# Patient Record
Sex: MALE | Race: WHITE | NOT HISPANIC OR LATINO | Employment: OTHER | URBAN - METROPOLITAN AREA
[De-identification: names, ages, dates, MRNs, and addresses within clinical notes are randomized per-mention and may not be internally consistent; named-entity substitution may affect disease eponyms.]

---

## 2023-03-07 LAB
ALANINE AMINOTRANSFERASE (SGPT) (U/L) IN SER/PLAS: 16 U/L (ref 10–52)
ALBUMIN (G/DL) IN SER/PLAS: 4.3 G/DL (ref 3.4–5)
ALKALINE PHOSPHATASE (U/L) IN SER/PLAS: 75 U/L (ref 33–136)
ANION GAP IN SER/PLAS: 12 MMOL/L (ref 10–20)
ASPARTATE AMINOTRANSFERASE (SGOT) (U/L) IN SER/PLAS: 23 U/L (ref 9–39)
BILIRUBIN TOTAL (MG/DL) IN SER/PLAS: 0.8 MG/DL (ref 0–1.2)
C REACTIVE PROTEIN (MG/L) IN SER/PLAS: <0.1 MG/DL
CALCIUM (MG/DL) IN SER/PLAS: 9.1 MG/DL (ref 8.6–10.3)
CARBON DIOXIDE, TOTAL (MMOL/L) IN SER/PLAS: 30 MMOL/L (ref 21–32)
CHLORIDE (MMOL/L) IN SER/PLAS: 102 MMOL/L (ref 98–107)
CREATINE KINASE (U/L) IN SER/PLAS: 315 U/L (ref 0–325)
CREATININE (MG/DL) IN SER/PLAS: 0.94 MG/DL (ref 0.5–1.3)
ERYTHROCYTE DISTRIBUTION WIDTH (RATIO) BY AUTOMATED COUNT: 13.6 % (ref 11.5–14.5)
ERYTHROCYTE MEAN CORPUSCULAR HEMOGLOBIN CONCENTRATION (G/DL) BY AUTOMATED: 32.6 G/DL (ref 32–36)
ERYTHROCYTE MEAN CORPUSCULAR VOLUME (FL) BY AUTOMATED COUNT: 104 FL (ref 80–100)
ERYTHROCYTES (10*6/UL) IN BLOOD BY AUTOMATED COUNT: 4.4 X10E12/L (ref 4.5–5.9)
GFR MALE: 84 ML/MIN/1.73M2
GLUCOSE (MG/DL) IN SER/PLAS: 88 MG/DL (ref 74–99)
HEMATOCRIT (%) IN BLOOD BY AUTOMATED COUNT: 45.7 % (ref 41–52)
HEMOGLOBIN (G/DL) IN BLOOD: 14.9 G/DL (ref 13.5–17.5)
LEUKOCYTES (10*3/UL) IN BLOOD BY AUTOMATED COUNT: 8 X10E9/L (ref 4.4–11.3)
PLATELETS (10*3/UL) IN BLOOD AUTOMATED COUNT: 323 X10E9/L (ref 150–450)
POTASSIUM (MMOL/L) IN SER/PLAS: 4.4 MMOL/L (ref 3.5–5.3)
PROTEIN TOTAL: 6.8 G/DL (ref 6.4–8.2)
SEDIMENTATION RATE, ERYTHROCYTE: 3 MM/H (ref 0–20)
SODIUM (MMOL/L) IN SER/PLAS: 140 MMOL/L (ref 136–145)
UREA NITROGEN (MG/DL) IN SER/PLAS: 22 MG/DL (ref 6–23)

## 2023-04-28 DIAGNOSIS — N40.0 BENIGN PROSTATIC HYPERPLASIA WITHOUT LOWER URINARY TRACT SYMPTOMS: ICD-10-CM

## 2023-04-28 RX ORDER — TAMSULOSIN HYDROCHLORIDE 0.4 MG/1
CAPSULE ORAL
Qty: 90 CAPSULE | Refills: 0 | Status: SHIPPED | OUTPATIENT
Start: 2023-04-28 | End: 2023-07-24

## 2023-05-10 ENCOUNTER — OFFICE VISIT (OUTPATIENT)
Dept: PRIMARY CARE | Facility: CLINIC | Age: 78
End: 2023-05-10
Payer: MEDICARE

## 2023-05-10 VITALS
WEIGHT: 155 LBS | BODY MASS INDEX: 21.7 KG/M2 | SYSTOLIC BLOOD PRESSURE: 124 MMHG | DIASTOLIC BLOOD PRESSURE: 76 MMHG | OXYGEN SATURATION: 95 % | HEART RATE: 64 BPM | HEIGHT: 71 IN

## 2023-05-10 DIAGNOSIS — E03.9 ACQUIRED HYPOTHYROIDISM: ICD-10-CM

## 2023-05-10 DIAGNOSIS — E78.2 MIXED HYPERLIPIDEMIA: Primary | ICD-10-CM

## 2023-05-10 DIAGNOSIS — M54.2 CERVICAL PAIN: ICD-10-CM

## 2023-05-10 DIAGNOSIS — M35.3 POLYMYALGIA RHEUMATICA (MULTI): ICD-10-CM

## 2023-05-10 DIAGNOSIS — M06.4 INFLAMMATORY POLYARTHRITIS (MULTI): ICD-10-CM

## 2023-05-10 PROBLEM — G62.9 PERIPHERAL NEUROPATHY: Status: ACTIVE | Noted: 2023-05-10

## 2023-05-10 PROBLEM — E78.41 ELEVATED LP(A): Status: RESOLVED | Noted: 2022-08-17 | Resolved: 2023-05-10

## 2023-05-10 PROBLEM — H25.10 NUCLEAR SENILE CATARACT: Status: RESOLVED | Noted: 2018-07-24 | Resolved: 2023-05-10

## 2023-05-10 PROBLEM — M51.9 LUMBAR DISC DISEASE: Status: ACTIVE | Noted: 2023-05-10

## 2023-05-10 PROBLEM — M25.512 BILATERAL SHOULDER PAIN: Status: RESOLVED | Noted: 2023-05-10 | Resolved: 2023-05-10

## 2023-05-10 PROBLEM — R23.9 SKIN CHANGE: Status: RESOLVED | Noted: 2023-05-10 | Resolved: 2023-05-10

## 2023-05-10 PROBLEM — E78.00 HIGH CHOLESTEROL: Status: RESOLVED | Noted: 2023-05-10 | Resolved: 2023-05-10

## 2023-05-10 PROBLEM — I65.23 OCCLUSION AND STENOSIS OF BILATERAL CAROTID ARTERIES: Status: RESOLVED | Noted: 2023-02-18 | Resolved: 2023-05-10

## 2023-05-10 PROBLEM — K64.8 INTERNAL HEMORRHOIDS WITH COMPLICATION: Status: RESOLVED | Noted: 2023-05-10 | Resolved: 2023-05-10

## 2023-05-10 PROBLEM — N40.0 BENIGN ENLARGEMENT OF PROSTATE: Status: ACTIVE | Noted: 2023-05-10

## 2023-05-10 PROBLEM — I47.10 SUPRAVENTRICULAR TACHYCARDIA (CMS-HCC): Status: RESOLVED | Noted: 2019-12-16 | Resolved: 2023-05-10

## 2023-05-10 PROBLEM — R74.8 ELEVATED CPK: Status: RESOLVED | Noted: 2022-08-17 | Resolved: 2023-05-10

## 2023-05-10 PROBLEM — M65.30 ACQUIRED TRIGGER FINGER: Status: ACTIVE | Noted: 2023-05-10

## 2023-05-10 PROBLEM — M25.511 BILATERAL SHOULDER PAIN: Status: RESOLVED | Noted: 2023-05-10 | Resolved: 2023-05-10

## 2023-05-10 PROBLEM — R21 SKIN RASH: Status: RESOLVED | Noted: 2023-05-10 | Resolved: 2023-05-10

## 2023-05-10 PROBLEM — D12.6 COLON ADENOMAS: Status: RESOLVED | Noted: 2023-05-10 | Resolved: 2023-05-10

## 2023-05-10 PROBLEM — I35.1 NONRHEUMATIC AORTIC VALVE INSUFFICIENCY: Status: ACTIVE | Noted: 2021-03-24

## 2023-05-10 PROCEDURE — 1159F MED LIST DOCD IN RCRD: CPT | Performed by: NURSE PRACTITIONER

## 2023-05-10 PROCEDURE — 99213 OFFICE O/P EST LOW 20 MIN: CPT | Performed by: NURSE PRACTITIONER

## 2023-05-10 PROCEDURE — 1036F TOBACCO NON-USER: CPT | Performed by: NURSE PRACTITIONER

## 2023-05-10 PROCEDURE — 1160F RVW MEDS BY RX/DR IN RCRD: CPT | Performed by: NURSE PRACTITIONER

## 2023-05-10 RX ORDER — FOLIC ACID 1 MG/1
1 TABLET ORAL
COMMUNITY
Start: 2017-01-06

## 2023-05-10 RX ORDER — LEVOTHYROXINE SODIUM 88 UG/1
88 TABLET ORAL
Qty: 90 TABLET | Refills: 3 | Status: SHIPPED | OUTPATIENT
Start: 2023-05-10 | End: 2024-01-30 | Stop reason: SDUPTHER

## 2023-05-10 RX ORDER — METHOTREXATE 2.5 MG/1
4 TABLET ORAL WEEKLY
COMMUNITY
Start: 2014-08-18 | End: 2024-01-04 | Stop reason: SDUPTHER

## 2023-05-10 RX ORDER — METOPROLOL SUCCINATE 50 MG/1
50 TABLET, EXTENDED RELEASE ORAL DAILY
COMMUNITY

## 2023-05-10 RX ORDER — PREDNISONE 2.5 MG/1
2.5 TABLET ORAL
COMMUNITY
End: 2024-05-21 | Stop reason: SDUPTHER

## 2023-05-10 RX ORDER — ASPIRIN 81 MG/1
81 TABLET ORAL
COMMUNITY
Start: 2022-12-19

## 2023-05-10 RX ORDER — LEVOTHYROXINE SODIUM 88 UG/1
88 TABLET ORAL
COMMUNITY
Start: 2019-04-03 | End: 2023-05-10 | Stop reason: SDUPTHER

## 2023-05-10 ASSESSMENT — PATIENT HEALTH QUESTIONNAIRE - PHQ9
SUM OF ALL RESPONSES TO PHQ9 QUESTIONS 1 AND 2: 0
2. FEELING DOWN, DEPRESSED OR HOPELESS: NOT AT ALL
1. LITTLE INTEREST OR PLEASURE IN DOING THINGS: NOT AT ALL

## 2023-05-10 NOTE — ASSESSMENT & PLAN NOTE
Reviewed both injectables  Reivewed lipid panel  Recommended he take one of the medications that has been approved  Follow up with cardiology as scheduled

## 2023-05-10 NOTE — ASSESSMENT & PLAN NOTE
Xrays completed - show diffuse DJD  Completed PT  Was recommended to follow up with PCP if he would like to continue  He would like to wait one month and then will see if pain is better  If not he will call and order PT

## 2023-05-10 NOTE — PROGRESS NOTES
"Subjective   Dominguez Ross Jr. is a 77 y.o. male who presents for left side neck pain x 4months.  Has had PT and U/S of neck at [facility]   Would like to discuss cholesterol medication - unable to tolerate statins and was discussing injections    HPI    ROS was completed and all systems are negative with the exception of what was noted in the the HPI.     Objective     /76 (BP Location: Left arm, Patient Position: Sitting, BP Cuff Size: Small adult)   Pulse 64   Ht 1.803 m (5' 11\")   Wt 70.3 kg (155 lb)   SpO2 95%   BMI 21.62 kg/m²      Physical Exam  Vitals and nursing note reviewed.   Constitutional:       Appearance: Normal appearance. He is normal weight.   HENT:      Head: Normocephalic and atraumatic.      Right Ear: External ear normal.      Left Ear: External ear normal.      Nose: Nose normal.      Mouth/Throat:      Mouth: Mucous membranes are moist.   Eyes:      Extraocular Movements: Extraocular movements intact.      Conjunctiva/sclera: Conjunctivae normal.      Pupils: Pupils are equal, round, and reactive to light.   Cardiovascular:      Rate and Rhythm: Normal rate and regular rhythm.      Pulses: Normal pulses.      Heart sounds: Normal heart sounds.   Pulmonary:      Effort: Pulmonary effort is normal.      Breath sounds: Normal breath sounds.   Musculoskeletal:      Cervical back: Normal range of motion. Tenderness (right side low cervical pain with moderate palpation) present.   Skin:     General: Skin is warm.      Capillary Refill: Capillary refill takes less than 2 seconds.   Neurological:      General: No focal deficit present.      Mental Status: He is alert and oriented to person, place, and time. Mental status is at baseline.   Psychiatric:         Mood and Affect: Mood normal.         Behavior: Behavior normal.         Thought Content: Thought content normal.         Judgment: Judgment normal.         Assessment/Plan   Problem List Items Addressed This Visit          Nervous "    Cervical pain     Xrays completed - show diffuse DJD  Completed PT  Was recommended to follow up with PCP if he would like to continue  He would like to wait one month and then will see if pain is better  If not he will call and order PT          Polymyalgia rheumatica (CMS/HCC)     Stable   Follows rheumatology             Musculoskeletal    Inflammatory polyarthritis (CMS/HCC)     Stable   Follows rheumatology             Endocrine/Metabolic    Hypothyroidism     Reviewed TSH  Stable  Continue levothyroxine 88 mcg            Other    Mixed hyperlipidemia - Primary     Reviewed both injectables  Reivewed lipid panel  Recommended he take one of the medications that has been approved  Follow up with cardiology as scheduled

## 2023-05-10 NOTE — PATIENT INSTRUCTIONS
Apply heat to your neck for 20 minutes 2-3 times a day  If you want to continue PT let me know    Follow up with cardiology - you should take the medication for your cholesterol

## 2023-06-05 ENCOUNTER — TELEPHONE (OUTPATIENT)
Dept: PRIMARY CARE | Facility: CLINIC | Age: 78
End: 2023-06-05
Payer: MEDICARE

## 2023-06-05 NOTE — TELEPHONE ENCOUNTER
Patient called stating he has neck pain for awhile and has used Tylenol and Advil without relief. Patient would like referral to ENT. No rush due to sounds like it has been going on a while

## 2023-06-08 NOTE — TELEPHONE ENCOUNTER
Spoke with patient he states he will call back to schedule appointment for neck pain. Regarding ENT he was in ER few weeks ago for vertigo and was recommended to see ENMT for further testing. He would prefer someone in [facility]

## 2023-06-12 DIAGNOSIS — R42 VERTIGO: Primary | ICD-10-CM

## 2023-06-12 NOTE — PROGRESS NOTES
Patient called requesting referral to ENT for vertigo.  Was in the hospital for the symptoms.  Referral placed

## 2023-07-11 LAB
ALANINE AMINOTRANSFERASE (SGPT) (U/L) IN SER/PLAS: 24 U/L (ref 10–52)
ALBUMIN (G/DL) IN SER/PLAS: 4.1 G/DL (ref 3.4–5)
ALKALINE PHOSPHATASE (U/L) IN SER/PLAS: 72 U/L (ref 33–136)
ANION GAP IN SER/PLAS: 14 MMOL/L (ref 10–20)
ASPARTATE AMINOTRANSFERASE (SGOT) (U/L) IN SER/PLAS: 30 U/L (ref 9–39)
BILIRUBIN TOTAL (MG/DL) IN SER/PLAS: 0.5 MG/DL (ref 0–1.2)
C REACTIVE PROTEIN (MG/L) IN SER/PLAS: <0.1 MG/DL
CALCIUM (MG/DL) IN SER/PLAS: 9.4 MG/DL (ref 8.6–10.6)
CARBON DIOXIDE, TOTAL (MMOL/L) IN SER/PLAS: 28 MMOL/L (ref 21–32)
CHLORIDE (MMOL/L) IN SER/PLAS: 105 MMOL/L (ref 98–107)
CREATINE KINASE (U/L) IN SER/PLAS: 283 U/L (ref 0–325)
CREATININE (MG/DL) IN SER/PLAS: 1.03 MG/DL (ref 0.5–1.3)
ERYTHROCYTE DISTRIBUTION WIDTH (RATIO) BY AUTOMATED COUNT: 13.6 % (ref 11.5–14.5)
ERYTHROCYTE MEAN CORPUSCULAR HEMOGLOBIN CONCENTRATION (G/DL) BY AUTOMATED: 32.6 G/DL (ref 32–36)
ERYTHROCYTE MEAN CORPUSCULAR VOLUME (FL) BY AUTOMATED COUNT: 103 FL (ref 80–100)
ERYTHROCYTES (10*6/UL) IN BLOOD BY AUTOMATED COUNT: 4.2 X10E12/L (ref 4.5–5.9)
GFR MALE: 75 ML/MIN/1.73M2
GLUCOSE (MG/DL) IN SER/PLAS: 90 MG/DL (ref 74–99)
HEMATOCRIT (%) IN BLOOD BY AUTOMATED COUNT: 43.3 % (ref 41–52)
HEMOGLOBIN (G/DL) IN BLOOD: 14.1 G/DL (ref 13.5–17.5)
LEUKOCYTES (10*3/UL) IN BLOOD BY AUTOMATED COUNT: 6.5 X10E9/L (ref 4.4–11.3)
NRBC (PER 100 WBCS) BY AUTOMATED COUNT: 0 /100 WBC (ref 0–0)
PLATELETS (10*3/UL) IN BLOOD AUTOMATED COUNT: 365 X10E9/L (ref 150–450)
POTASSIUM (MMOL/L) IN SER/PLAS: 4.6 MMOL/L (ref 3.5–5.3)
PROTEIN TOTAL: 6.6 G/DL (ref 6.4–8.2)
SEDIMENTATION RATE, ERYTHROCYTE: 9 MM/H (ref 0–20)
SODIUM (MMOL/L) IN SER/PLAS: 142 MMOL/L (ref 136–145)
UREA NITROGEN (MG/DL) IN SER/PLAS: 20 MG/DL (ref 6–23)

## 2023-07-24 DIAGNOSIS — N40.0 BENIGN PROSTATIC HYPERPLASIA WITHOUT LOWER URINARY TRACT SYMPTOMS: ICD-10-CM

## 2023-07-24 RX ORDER — TAMSULOSIN HYDROCHLORIDE 0.4 MG/1
CAPSULE ORAL
Qty: 90 CAPSULE | Refills: 1 | Status: SHIPPED | OUTPATIENT
Start: 2023-07-24 | End: 2024-01-22

## 2023-08-22 ENCOUNTER — HOSPITAL ENCOUNTER (OUTPATIENT)
Dept: DATA CONVERSION | Facility: HOSPITAL | Age: 78
Discharge: HOME | End: 2023-08-22
Payer: MEDICARE

## 2023-08-22 DIAGNOSIS — M47.812 SPONDYLOSIS WITHOUT MYELOPATHY OR RADICULOPATHY, CERVICAL REGION: ICD-10-CM

## 2023-11-21 ENCOUNTER — OFFICE VISIT (OUTPATIENT)
Dept: RHEUMATOLOGY | Facility: CLINIC | Age: 78
End: 2023-11-21
Payer: MEDICARE

## 2023-11-21 ENCOUNTER — LAB (OUTPATIENT)
Dept: LAB | Facility: LAB | Age: 78
End: 2023-11-21
Payer: MEDICARE

## 2023-11-21 VITALS
DIASTOLIC BLOOD PRESSURE: 72 MMHG | HEIGHT: 70 IN | WEIGHT: 153 LBS | SYSTOLIC BLOOD PRESSURE: 117 MMHG | BODY MASS INDEX: 21.9 KG/M2

## 2023-11-21 DIAGNOSIS — Z79.899 ENCOUNTER FOR EYE EXAM DUE TO HIGH RISK MEDICATION: ICD-10-CM

## 2023-11-21 DIAGNOSIS — M35.3 POLYMYALGIA RHEUMATICA (MULTI): ICD-10-CM

## 2023-11-21 DIAGNOSIS — M35.3 POLYMYALGIA RHEUMATICA (MULTI): Primary | ICD-10-CM

## 2023-11-21 LAB
ALBUMIN SERPL BCP-MCNC: 4.4 G/DL (ref 3.4–5)
ALP SERPL-CCNC: 80 U/L (ref 33–136)
ALT SERPL W P-5'-P-CCNC: 17 U/L (ref 10–52)
ANION GAP SERPL CALC-SCNC: 11 MMOL/L (ref 10–20)
AST SERPL W P-5'-P-CCNC: 24 U/L (ref 9–39)
BILIRUB SERPL-MCNC: 0.7 MG/DL (ref 0–1.2)
BUN SERPL-MCNC: 21 MG/DL (ref 6–23)
CALCIUM SERPL-MCNC: 9.5 MG/DL (ref 8.6–10.3)
CHLORIDE SERPL-SCNC: 104 MMOL/L (ref 98–107)
CK SERPL-CCNC: 262 U/L (ref 0–325)
CO2 SERPL-SCNC: 30 MMOL/L (ref 21–32)
CREAT SERPL-MCNC: 0.92 MG/DL (ref 0.5–1.3)
CRP SERPL-MCNC: 0.1 MG/DL
ERYTHROCYTE [DISTWIDTH] IN BLOOD BY AUTOMATED COUNT: 13.4 % (ref 11.5–14.5)
ERYTHROCYTE [SEDIMENTATION RATE] IN BLOOD BY WESTERGREN METHOD: 13 MM/H (ref 0–20)
GFR SERPL CREATININE-BSD FRML MDRD: 86 ML/MIN/1.73M*2
GLUCOSE SERPL-MCNC: 93 MG/DL (ref 74–99)
HCT VFR BLD AUTO: 45.5 % (ref 41–52)
HGB BLD-MCNC: 14.6 G/DL (ref 13.5–17.5)
MCH RBC QN AUTO: 33.3 PG (ref 26–34)
MCHC RBC AUTO-ENTMCNC: 32.1 G/DL (ref 32–36)
MCV RBC AUTO: 104 FL (ref 80–100)
NRBC BLD-RTO: 0 /100 WBCS (ref 0–0)
PLATELET # BLD AUTO: 365 X10*3/UL (ref 150–450)
POTASSIUM SERPL-SCNC: 4.5 MMOL/L (ref 3.5–5.3)
PROT SERPL-MCNC: 6.9 G/DL (ref 6.4–8.2)
RBC # BLD AUTO: 4.38 X10*6/UL (ref 4.5–5.9)
SODIUM SERPL-SCNC: 140 MMOL/L (ref 136–145)
WBC # BLD AUTO: 6.4 X10*3/UL (ref 4.4–11.3)

## 2023-11-21 PROCEDURE — 86140 C-REACTIVE PROTEIN: CPT

## 2023-11-21 PROCEDURE — 82550 ASSAY OF CK (CPK): CPT

## 2023-11-21 PROCEDURE — 36415 COLL VENOUS BLD VENIPUNCTURE: CPT

## 2023-11-21 PROCEDURE — 80053 COMPREHEN METABOLIC PANEL: CPT

## 2023-11-21 PROCEDURE — 1159F MED LIST DOCD IN RCRD: CPT | Performed by: INTERNAL MEDICINE

## 2023-11-21 PROCEDURE — 85652 RBC SED RATE AUTOMATED: CPT

## 2023-11-21 PROCEDURE — 1160F RVW MEDS BY RX/DR IN RCRD: CPT | Performed by: INTERNAL MEDICINE

## 2023-11-21 PROCEDURE — 99213 OFFICE O/P EST LOW 20 MIN: CPT | Performed by: INTERNAL MEDICINE

## 2023-11-21 PROCEDURE — 85027 COMPLETE CBC AUTOMATED: CPT

## 2023-11-21 PROCEDURE — 1036F TOBACCO NON-USER: CPT | Performed by: INTERNAL MEDICINE

## 2023-11-21 ASSESSMENT — ENCOUNTER SYMPTOMS
NECK STIFFNESS: 1
NECK PAIN: 1
DIZZINESS: 1
FATIGUE: 1

## 2023-11-21 NOTE — PROGRESS NOTES
Subjective   Patient ID: Dominguez Ross is a 78 y.o. male who presents for Follow-up (4 month follow up ).  HPI  Patient with history of polymyalgia rheumatica with elevated CPK and inflammatory arthritis.    At his last visit on 7/11/2023 we had him continue with methotrexate and prednisone 2.5 mg.  He was having issues with his neck and we had referred him to pain management.  He had been having a lot of stress with the health of his wife at the time.    Overall he has been doing okay.  He did see pain management and had shots in his neck that did really help for a couple of weeks.  Still has some stiffness.  He has been doing his stretches.    When he bends over he thinks he has been having some vertigo or dizziness issues.  He was unable to make an appointment with ENT to be evaluated for vertigo.    His hands have been doing okay.    He did have a bad sore throat about a month ago.    No longer has trigger finger since his injection.      Review of Systems   Constitutional:  Positive for fatigue.   Musculoskeletal:  Positive for neck pain and neck stiffness.   Neurological:  Positive for dizziness.       Objective   Physical Exam  GEN: NAD A&O x3  HEENT:no conjunctival redness, wearing glasses  NEURO: able to get out of chair without issues good strength in his upper and lower extremities  SKIN: no rashes  PULSES: +radials  MSK: no current swelling in the dip pip mcp wrist elbows shoulders knees ankles.      Assessment/Plan       Elevated CPK history of PMR symptoms with elevated ESR with history of inflammatory arthritis. Has tried steroid sparing agents but did not change his elevated cpk.  Still gets episodes of vertigo when he bends over and stands back up.  Suggest that he make an appointment with ENT.  Does not have any evidence of inflammatory arthropathy today.  His strength has been stable.  He has been gaining weight again.  We will have him do blood work and have him come back again in 5 to 6 months  or as needed.

## 2023-11-22 NOTE — RESULT ENCOUNTER NOTE
I have reviewed your blood work from 11/21/2023.    Your complete blood count shows mild anemia with a mild decrease in your red blood cell count.    Your comprehensive metabolic panel showed normal liver and kidney functions.    Your inflammatory markers, sedimentation rate and C-reactive protein, were normal.    Your muscle marker, creatinine kinase, was normal.

## 2023-12-04 ENCOUNTER — OFFICE VISIT (OUTPATIENT)
Dept: PRIMARY CARE | Facility: CLINIC | Age: 78
End: 2023-12-04
Payer: MEDICARE

## 2023-12-04 ENCOUNTER — LAB (OUTPATIENT)
Dept: LAB | Facility: LAB | Age: 78
End: 2023-12-04
Payer: MEDICARE

## 2023-12-04 ENCOUNTER — APPOINTMENT (OUTPATIENT)
Dept: PRIMARY CARE | Facility: CLINIC | Age: 78
End: 2023-12-04
Payer: MEDICARE

## 2023-12-04 VITALS
OXYGEN SATURATION: 99 % | DIASTOLIC BLOOD PRESSURE: 74 MMHG | WEIGHT: 155.2 LBS | RESPIRATION RATE: 18 BRPM | BODY MASS INDEX: 22.22 KG/M2 | HEART RATE: 62 BPM | SYSTOLIC BLOOD PRESSURE: 118 MMHG | HEIGHT: 70 IN

## 2023-12-04 DIAGNOSIS — Z12.11 COLON CANCER SCREENING: ICD-10-CM

## 2023-12-04 DIAGNOSIS — Z00.00 ROUTINE GENERAL MEDICAL EXAMINATION AT HEALTH CARE FACILITY: ICD-10-CM

## 2023-12-04 DIAGNOSIS — E78.2 MIXED HYPERLIPIDEMIA: ICD-10-CM

## 2023-12-04 DIAGNOSIS — Z12.5 PROSTATE CANCER SCREENING: ICD-10-CM

## 2023-12-04 DIAGNOSIS — Z00.00 MEDICARE ANNUAL WELLNESS VISIT, SUBSEQUENT: ICD-10-CM

## 2023-12-04 DIAGNOSIS — E03.9 ACQUIRED HYPOTHYROIDISM: ICD-10-CM

## 2023-12-04 DIAGNOSIS — M35.3 POLYMYALGIA RHEUMATICA (MULTI): ICD-10-CM

## 2023-12-04 DIAGNOSIS — M06.4 INFLAMMATORY POLYARTHRITIS (MULTI): ICD-10-CM

## 2023-12-04 PROBLEM — M65.30 ACQUIRED TRIGGER FINGER: Status: RESOLVED | Noted: 2023-05-10 | Resolved: 2023-12-04

## 2023-12-04 LAB
T4 FREE SERPL-MCNC: 0.95 NG/DL (ref 0.61–1.12)
TSH SERPL-ACNC: 0.44 MIU/L (ref 0.44–3.98)

## 2023-12-04 PROCEDURE — 36415 COLL VENOUS BLD VENIPUNCTURE: CPT

## 2023-12-04 PROCEDURE — 1170F FXNL STATUS ASSESSED: CPT | Performed by: NURSE PRACTITIONER

## 2023-12-04 PROCEDURE — 1159F MED LIST DOCD IN RCRD: CPT | Performed by: NURSE PRACTITIONER

## 2023-12-04 PROCEDURE — 84443 ASSAY THYROID STIM HORMONE: CPT

## 2023-12-04 PROCEDURE — 1160F RVW MEDS BY RX/DR IN RCRD: CPT | Performed by: NURSE PRACTITIONER

## 2023-12-04 PROCEDURE — G0439 PPPS, SUBSEQ VISIT: HCPCS | Performed by: NURSE PRACTITIONER

## 2023-12-04 PROCEDURE — 84439 ASSAY OF FREE THYROXINE: CPT

## 2023-12-04 PROCEDURE — 1036F TOBACCO NON-USER: CPT | Performed by: NURSE PRACTITIONER

## 2023-12-04 PROCEDURE — G0103 PSA SCREENING: HCPCS

## 2023-12-04 ASSESSMENT — ACTIVITIES OF DAILY LIVING (ADL)
BATHING: INDEPENDENT
DRESSING: INDEPENDENT
MANAGING_FINANCES: INDEPENDENT
GROCERY_SHOPPING: INDEPENDENT
DOING_HOUSEWORK: INDEPENDENT
TAKING_MEDICATION: INDEPENDENT

## 2023-12-04 ASSESSMENT — ENCOUNTER SYMPTOMS
LOSS OF SENSATION IN FEET: 0
DEPRESSION: 0
OCCASIONAL FEELINGS OF UNSTEADINESS: 0

## 2023-12-04 NOTE — PROGRESS NOTES
"Subjective   Patient ID: Dominguez Ross is a 78 y.o. male who presents for Medicare Annual Wellness Visit Subsequent (Dominguez is in today for Medicare Wellness visit. At this time reports no other concerns at this time. ).    78-year-old male here for annual Medicare wellness exam.  He has no acute concerns   vertigo on and off since May 2023. Was recommended to see ENT, Dr Reyez but has not called to schedule yet.   Rheum- PMR, inflam arthritis, methotrexate. No side effects.   CV Dr Yin- started on Metoprolol XL 3 years ago. Was not concerned with the low Pulse, not aware of the dizziness and low BP.   ETOH- occasional beer  No nicotene  Opthal- cataract SX 5 yrs ago. No acute issues. OV 2 yrs  Dentist- q 6 mo  Hearing- vertigo. Has not seen ENT or audiologist  Up to date on vaccine.          Review of Systems   All other systems reviewed and are negative.      Objective   BP 92/64   Pulse 62   Resp 18   Ht 1.778 m (5' 10\")   Wt 70.4 kg (155 lb 3.2 oz)   SpO2 99%   BMI 22.27 kg/m²     Physical Exam  Vitals and nursing note reviewed.   Constitutional:       General: He is not in acute distress.     Appearance: Normal appearance.   HENT:      Head: Normocephalic and atraumatic.      Right Ear: External ear normal.      Left Ear: External ear normal.      Nose: Nose normal.      Mouth/Throat:      Mouth: Mucous membranes are moist.      Pharynx: Oropharynx is clear.   Eyes:      Extraocular Movements: Extraocular movements intact.      Conjunctiva/sclera: Conjunctivae normal.      Pupils: Pupils are equal, round, and reactive to light.   Neck:      Vascular: No carotid bruit.   Cardiovascular:      Rate and Rhythm: Normal rate and regular rhythm.      Pulses: Normal pulses.      Heart sounds: Normal heart sounds.   Pulmonary:      Effort: Pulmonary effort is normal.      Breath sounds: Normal breath sounds.   Abdominal:      General: Bowel sounds are normal. There is no distension.      Palpations: Abdomen is " soft.      Tenderness: There is no abdominal tenderness.   Musculoskeletal:         General: Normal range of motion.      Cervical back: Normal range of motion.      Right lower leg: No edema.      Left lower leg: No edema.   Lymphadenopathy:      Cervical: No cervical adenopathy.   Skin:     General: Skin is warm and dry.      Capillary Refill: Capillary refill takes less than 2 seconds.   Neurological:      General: No focal deficit present.      Mental Status: He is alert and oriented to person, place, and time.   Psychiatric:         Mood and Affect: Mood normal.         Behavior: Behavior normal.         Thought Content: Thought content normal.         Judgment: Judgment normal.         Assessment/Plan   Diagnoses and all orders for this visit:  Acquired hypothyroidism  Comments:  Update TSH and free T4.  On levothyroxine  Orders:  -     Thyroid Stimulating Hormone; Future  -     Thyroxine, Free; Future  Polymyalgia rheumatica (CMS/HCC)  Comments:  Managed by rheumatology, most recent labs reviewed  Inflammatory polyarthritis (CMS/HCC)  Comments:  rheum  Prostate cancer screening  Comments:  Check PSA  Orders:  -     PSA, Total and Free; Future  Mixed hyperlipidemia  Comments:  Chronic elevation cholesterol and LDL.  Labs reviewed from the year  Medicare annual wellness visit, subsequent  Comments:  Screening will be up-to-date  Colon cancer screening  Comments:  Colon cancer screening via colonoscopy ordered  Orders:  -     Colonoscopy Screening; Average Risk Patient; Future  Routine general medical examination at health care facility  -     1 Year Follow Up In Primary Care - Wellness Exam; Future

## 2023-12-07 LAB
PSA FREE MFR SERPL: 23 %
PSA FREE SERPL-MCNC: 0.7 NG/ML
PSA SERPL IA-MCNC: 3.1 NG/ML (ref 0–4)

## 2024-01-04 DIAGNOSIS — M35.3 POLYMYALGIA RHEUMATICA (MULTI): Primary | ICD-10-CM

## 2024-01-05 RX ORDER — METHOTREXATE 2.5 MG/1
10 TABLET ORAL
Qty: 52 TABLET | Refills: 3 | Status: SHIPPED | OUTPATIENT
Start: 2024-01-05 | End: 2024-05-21 | Stop reason: SDUPTHER

## 2024-01-19 DIAGNOSIS — N40.0 BENIGN PROSTATIC HYPERPLASIA WITHOUT LOWER URINARY TRACT SYMPTOMS: ICD-10-CM

## 2024-01-22 RX ORDER — TAMSULOSIN HYDROCHLORIDE 0.4 MG/1
CAPSULE ORAL
Qty: 90 CAPSULE | Refills: 1 | Status: SHIPPED | OUTPATIENT
Start: 2024-01-22 | End: 2024-01-30 | Stop reason: SDUPTHER

## 2024-01-30 DIAGNOSIS — N40.0 BENIGN PROSTATIC HYPERPLASIA WITHOUT LOWER URINARY TRACT SYMPTOMS: ICD-10-CM

## 2024-01-30 DIAGNOSIS — E03.9 ACQUIRED HYPOTHYROIDISM: ICD-10-CM

## 2024-02-01 RX ORDER — TAMSULOSIN HYDROCHLORIDE 0.4 MG/1
0.4 CAPSULE ORAL DAILY
Qty: 90 CAPSULE | Refills: 1 | Status: SHIPPED | OUTPATIENT
Start: 2024-02-01

## 2024-02-01 RX ORDER — LEVOTHYROXINE SODIUM 88 UG/1
88 TABLET ORAL
Qty: 90 TABLET | Refills: 1 | Status: SHIPPED | OUTPATIENT
Start: 2024-02-01

## 2024-02-02 DIAGNOSIS — Z12.11 COLON CANCER SCREENING: ICD-10-CM

## 2024-02-02 RX ORDER — POLYETHYLENE GLYCOL 3350, SODIUM SULFATE ANHYDROUS, SODIUM BICARBONATE, SODIUM CHLORIDE, POTASSIUM CHLORIDE 236; 22.74; 6.74; 5.86; 2.97 G/4L; G/4L; G/4L; G/4L; G/4L
4000 POWDER, FOR SOLUTION ORAL ONCE
Qty: 4000 ML | Refills: 0 | Status: SHIPPED | OUTPATIENT
Start: 2024-02-02 | End: 2024-02-02

## 2024-02-20 ENCOUNTER — CLINICAL SUPPORT (OUTPATIENT)
Dept: AUDIOLOGY | Facility: CLINIC | Age: 79
End: 2024-02-20
Payer: MEDICARE

## 2024-02-20 ENCOUNTER — OFFICE VISIT (OUTPATIENT)
Dept: OTOLARYNGOLOGY | Facility: CLINIC | Age: 79
End: 2024-02-20
Payer: MEDICARE

## 2024-02-20 VITALS — HEIGHT: 71 IN | BODY MASS INDEX: 21.56 KG/M2 | WEIGHT: 154 LBS

## 2024-02-20 DIAGNOSIS — H90.3 SENSORINEURAL HEARING LOSS (SNHL) OF BOTH EARS: ICD-10-CM

## 2024-02-20 DIAGNOSIS — H90.3 ASYMMETRICAL SENSORINEURAL HEARING LOSS: ICD-10-CM

## 2024-02-20 DIAGNOSIS — R42 DIZZINESS: Primary | ICD-10-CM

## 2024-02-20 DIAGNOSIS — R42 DIZZINESS AND GIDDINESS: Primary | ICD-10-CM

## 2024-02-20 PROCEDURE — 92567 TYMPANOMETRY: CPT | Performed by: AUDIOLOGIST

## 2024-02-20 PROCEDURE — 92557 COMPREHENSIVE HEARING TEST: CPT | Performed by: AUDIOLOGIST

## 2024-02-20 PROCEDURE — 1159F MED LIST DOCD IN RCRD: CPT | Performed by: OTOLARYNGOLOGY

## 2024-02-20 PROCEDURE — 99204 OFFICE O/P NEW MOD 45 MIN: CPT | Performed by: OTOLARYNGOLOGY

## 2024-02-20 PROCEDURE — 1036F TOBACCO NON-USER: CPT | Performed by: OTOLARYNGOLOGY

## 2024-02-20 ASSESSMENT — ENCOUNTER SYMPTOMS
OCCASIONAL FEELINGS OF UNSTEADINESS: 1
DEPRESSION: 0
LOSS OF SENSATION IN FEET: 1

## 2024-02-20 NOTE — PROGRESS NOTES
AUDIOLOGY ADULT AUDIOMETRIC EVALUATION    Name:  Dominguez Ross  :  1945  Age:  78 y.o.  Date of Evaluation:  2024    Reason for visit: Mr. Ross is seen in the clinic today at the request of Kaveh Reyez MD in otolaryngology for an audiologic evaluation.     HISTORY  The patient reported experiencing an episode of dizziness last , where he was kept in the hospital overnight.  Since then he has felt off balance.  Occasional bilateral tinnitus and a history of occupational noise exposure were reported and otalgia and aural pressure were denied.  The patient reported that he has difficulty understanding people when he is in background noise.    EVALUATION  See scanned audiogram: “Media” > “Audiology Report”.    RESULTS  Otoscopic Evaluation:  Right Ear: clear ear canal  Left Ear: clear ear canal    Immittance Measures:  Tympanometry:  Right Ear: Type A, normal tympanic membrane mobility with normal middle ear pressure   Left Ear: Type A, normal tympanic membrane mobility with normal middle ear pressure     Acoustic Reflexes:  Ipsilateral Right Ear: Could not evaluate since an adequate seal could not be maintained    Ipsilateral Left Ear: Could not evaluate since an adequate seal could not be maintained    Contralateral Right Ear: did not evaluate  Contralateral Left Ear: did not evaluate    Distortion Product Otoacoustic Emissions (DPOAEs):  Right Ear: passed 8995-2756 and 5000 Hz; absent 1311-7409 and 3041-3827 Hz  Left Ear: passed 1000 Hz; absent 3855-0056 Hz    Audiometry:  Test Technique and Reliability:   Standard audiometry via insert earphones/supra-aural headphones. Reliability is good.    Pure tone air and bone conduction audiometry:  Right Ear: normal hearing through 2000 Hz, with a moderate to moderately-severe sensorineural hearing loss at 2774-1140 Hz  Left Ear: mild to moderately-severe sensorineural hearing loss at 0711-9724 Hz    Speech Audiometry (Word Recognition  Scores):   Right Ear: Excellent, 92%   Left Ear: Excellent, 92%     IMPRESSIONS  Results of today's audiometric evaluation revealed an asymmetrical sensorineural hearing loss.    Present DPOAEs suggest normal/near normal cochlear outer hair cell function and are consistent with no greater than a mild hearing loss at those frequencies. Absent DPOAEs are consistent with abnormal cochlear outer hair cell function at those frequencies.    RECOMMENDATIONS  - Follow up with otolaryngology today as scheduled- note asymmetry.  - Annual audiologic evaluation, sooner if an acute change is noted.  - Wear hearing protection when exposed to excessive noise.   - Consider hearing aids. Contact insurance to determine if there is an applicable benefit and where it can be used. Contact our office to schedule an appointment should he wish to proceed with hearing aids through our clinic.  - Follow-up with medical care team as planned.    PATIENT EDUCATION  Discussed results, impressions and recommendations with the patient. Questions were addressed and the patient was encouraged to contact our office should concerns arise.    Time for this encounter: 1:00-1:30    Di Yin M.A., CCC-A   Licensed Audiologist

## 2024-02-20 NOTE — PROGRESS NOTES
Chief Complaint   Patient presents with    New Patient Visit     NPV- Vertigo      Date of Evaluation: 2/20/2024   ZOIE Ross is a 78 y.o. male here for evaluation of dizziness.  9 months ago he had acute onset vertigo.  He was admitted to the hospital and kept overnight for workup that was negative.  His dizziness has improved but he still has some unsteadiness when walking around.  He does have a history of cervical disc disease.  This has been less symptomatic lately.  He has unsteadiness every day.  No vertigo at rest.  His audiogram shows bilateral mid to high-frequency sensorineural hearing loss with slight asymmetry with the left ear worse than right in the mid frequencies.  He had noise exposure working at [employer] for 30 years.  He wore hearing protection.       Past Medical History:   Diagnosis Date    Abnormal finding of blood chemistry, unspecified 12/30/2013    Abnormal blood chemistry    Abnormal levels of other serum enzymes 03/10/2020    Elevated CPK    Abrasion of left upper arm, initial encounter 09/04/2019    Arm abrasion, left, initial encounter    Acquired trigger finger 05/10/2023    Acute upper respiratory infection, unspecified 12/29/2017    Acute upper respiratory infection    Autoimmune thyroiditis     Hashimoto's thyroiditis    Bilateral shoulder pain 05/10/2023    Colon adenomas 05/10/2023    Elevated CPK 08/17/2022    Encounter for general adult medical examination without abnormal findings 11/17/2017    Annual physical exam    Hemorrhage of anus and rectum 04/03/2019    Bright red rectal bleeding    Myalgia, unspecified site     Muscular aches    Neck pain on left side 05/10/2023    Nuclear senile cataract 07/24/2018    Formatting of this note might be different from the original. Added automatically from request for surgery [id number] Formatting of this note might be different from the original. Added automatically from request for surgery [id number]    Occlusion and stenosis  of bilateral carotid arteries 02/18/2023    Formatting of this note might be different from the original. Mild Test done at  2/2023    Other conditions influencing health status     Inflammatory Myopathy (Myositis)    Other conditions influencing health status     Nephrolithiasis Of The Right Kidney    Other conditions influencing health status     Induced CPK Elevation    Other conditions influencing health status 01/20/2016    History of cough    Other conditions influencing health status 11/17/2017    Chronic steroid use    Other disorders of iron metabolism     Iron overload    Pain in unspecified joint     Arthralgia of multiple sites    Personal history of other (healed) physical injury and trauma 11/03/2017    History of insect bite    Personal history of other diseases of male genital organs 10/02/2012    History of benign prostatic hypertrophy    Personal history of other diseases of the digestive system     History of hemorrhoids    Personal history of other diseases of the musculoskeletal system and connective tissue     History of polymyalgia rheumatica    Personal history of other diseases of the nervous system and sense organs     History of carpal tunnel syndrome    Personal history of other diseases of the nervous system and sense organs     History of peripheral neuropathy    Personal history of other diseases of the respiratory system 05/01/2018    History of acute sinusitis    Personal history of other diseases of the respiratory system 11/12/2015    History of acute pharyngitis    Personal history of other diseases of the respiratory system 03/09/2016    History of acute bronchitis    Personal history of other drug therapy 11/15/2016    History of influenza vaccination    Personal history of other endocrine, nutritional and metabolic disease     History of hypercholesterolemia    Personal history of other endocrine, nutritional and metabolic disease 11/17/2017    History of vitamin D deficiency  "   Personal history of other specified conditions     History of abdominal pain    Personal history of other specified conditions 02/12/2013    History of chest pain    Polyneuropathy, unspecified     Polyneuropathy    Skin change 05/10/2023    Sprain of joints and ligaments of other parts of neck, initial encounter     Sprain of joints and ligaments of other parts of neck    Supraventricular tachycardia 12/16/2019      Past Surgical History:   Procedure Laterality Date    BACK SURGERY  08/02/2012    Lower Back Surgery    COLONOSCOPY  10/29/2012    Complete Colonoscopy    MR HEAD ANGIO WO IV CONTRAST  5/24/2023    MR HEAD ANGIO WO IV CONTRAST [facility]    OTHER SURGICAL HISTORY  08/02/2012    Biopsy Muscle          Medications:   Current Outpatient Medications   Medication Instructions    aspirin 81 mg, oral, Daily RT    folic acid (FOLVITE) 1 mg, oral, Daily RT    levothyroxine (SYNTHROID, LEVOXYL) 88 mcg, oral, Daily before breakfast    methotrexate (TREXALL) 10 mg, oral, Weekly    metoprolol succinate XL (TOPROL-XL) 50 mg, oral, Daily    predniSONE (DELTASONE) 2.5 mg, oral, Daily RT    tamsulosin (FLOMAX) 0.4 mg, oral, Daily        Allergies:  Allergies   Allergen Reactions    Statins-Hmg-Coa Reductase Inhibitors Other        Physical Exam:  Last Recorded Vitals  Height 1.803 m (5' 11\"), weight 69.9 kg (154 lb).  []General appearance: Well-developed, well-nourished in no acute distress, conversant with normal voice quality    Head/face: No erythema or edema or facial tenderness, and normal facial nerve function bilaterally    External ear: Clear external auditory canals with normal pinnae  Tube status: N/A  Middle ear: Tympanic membranes intact and mobile, middle ears normal.  Tympanic membrane perforation: N/A  Mastoid bowl: N/A  Hearing: Normal conversational awareness at normal speech thresholds    Nose visualized using: Anterior rhinoscopy  Nasal dorsum: Nontraumatic midline appearance  Septum: " Midline, nonobstructing  Inferior turbinates: Normal, pink  Secretions: Dry    Oral cavity and oropharynx: Normal  Teeth: Good condition  Floor of mouth: without lesions  Palate: Normal hard palate, soft palate and uvula  Oropharynx: Clear, no lesions present  Buccal mucosa: Normal without masses or lesions  Lips: Normal    Nasopharynx: Inadequate mirror exam secondary to gag/anatomy    Neck:  Salivary glands: Normal bilateral parotid and submandibular glands by inspection and palpation.  Non-thyroid masses: No palpable masses or significant lymphadenopathy  Trachea: Midline  Thyroid: No thyromegaly or palpable nodules  Temporomandibular joint: Nontender  Cervical range of motion: Normal    Neurologic exam: Alert and oriented x3, appropriate affect.  Cranial nerves II-XII normal bilaterally  Extraocular movement: Extraocular movement intact, normal gaze alignment        Dominguez was seen today for new patient visit.  Diagnoses and all orders for this visit:  Dizziness and giddiness (Primary)  -     V-Hit; Future  Sensorineural hearing loss (SNHL) of both ears       PLAN  I have recommended we proceed with a VNG.  He will follow-up with me afterwards.  A hearing aid evaluation is advisable    Kaveh Reyez MD

## 2024-03-01 ENCOUNTER — OFFICE VISIT (OUTPATIENT)
Dept: GASTROENTEROLOGY | Facility: EXTERNAL LOCATION | Age: 79
End: 2024-03-01
Payer: MEDICARE

## 2024-03-01 DIAGNOSIS — Z12.11 COLON CANCER SCREENING: ICD-10-CM

## 2024-03-01 DIAGNOSIS — D12.2 BENIGN NEOPLASM OF ASCENDING COLON: ICD-10-CM

## 2024-03-01 DIAGNOSIS — Z12.11 COLON CANCER SCREENING: Primary | ICD-10-CM

## 2024-03-01 DIAGNOSIS — Z86.010 HISTORY OF COLONIC POLYPS: ICD-10-CM

## 2024-03-01 PROCEDURE — 0753T DGTZ GLS MCRSCP SLD LEVEL IV: CPT

## 2024-03-01 PROCEDURE — 45385 COLONOSCOPY W/LESION REMOVAL: CPT | Performed by: INTERNAL MEDICINE

## 2024-03-01 PROCEDURE — 1159F MED LIST DOCD IN RCRD: CPT | Performed by: INTERNAL MEDICINE

## 2024-03-01 PROCEDURE — 88305 TISSUE EXAM BY PATHOLOGIST: CPT

## 2024-03-01 PROCEDURE — 1036F TOBACCO NON-USER: CPT | Performed by: INTERNAL MEDICINE

## 2024-03-01 PROCEDURE — 88305 TISSUE EXAM BY PATHOLOGIST: CPT | Performed by: PATHOLOGY

## 2024-03-01 NOTE — PROGRESS NOTES
Colonoscopy showed no signs of any malignancy.  3 polyps which were small were removed.  I do not think he needs further surveillance.

## 2024-03-04 ENCOUNTER — CLINICAL SUPPORT (OUTPATIENT)
Dept: AUDIOLOGY | Facility: CLINIC | Age: 79
End: 2024-03-04
Payer: MEDICARE

## 2024-03-04 DIAGNOSIS — R42 DIZZINESS AND GIDDINESS: ICD-10-CM

## 2024-03-04 PROCEDURE — 92537 CALORIC VSTBLR TEST W/REC: CPT | Performed by: AUDIOLOGIST

## 2024-03-04 PROCEDURE — 92540 BASIC VESTIBULAR EVALUATION: CPT | Performed by: AUDIOLOGIST

## 2024-03-04 NOTE — PROGRESS NOTES
"REPORT FOR VIDEONYSTAGMOGRAPHY (VNG)    History: Patient referred by Kaveh Reyez MD for Videonystagmography.   Patient reports a significant decrease in balance; often has to \"hold on\".    *Significant fall risk.  In 2023, patient reports he had a significant attack of imbalance, room spinning; hospitalized for one night.   Perisistent off balanced feeling.   He reports a constant feeling of being \"off\"/slight dizziness.   Patient reports he has significant neck issues; stiffness; pain.      Otoscopy: Clear ear canals bilaterally.     Test Parameters:  Ocular Motility Testing to visually guided targets was conducted using a dual channel video-recording technique for the recording of eye movement in the horizontal and vertical planes.  Bithermal air caloric testing was performed at 48 degrees C and 24 degrees C.      Random Saccades: Abnormal: Abnormal accuracy on 3 separate trials; normal latency and velocity.      Gaze Tests: Normal;  Normal gaze to left, right, up, and down targets.     Smooth Pursuit: Normal   Optokinetic: Normal: Peak slow-phase velocity (SPV) within normal limits.    Spontaneous Nystagmus: Normal; no clinically significant nystagmus recorded  Addy Hallpike: Normal: no clinically significant nystagmus recorded; no patient report dizziness.    Positionals: Normal: no clinically significant nystagmus reported in seated, supine, head right or head left positions.     Calorics: Abnormal;  Caloric responses were symmetrical.  Positive for Failure of Fixation Suppression      RW:  42    LW: 48     RC:   20          Caloric weakness: NOT significant      Directional Preponderance: NOT Significant      Failure of Fixation Suppression: Positive     vHIT: did not perform; significant neck issues     *PLEASE SEE SCANNED IN REPORT AND RECORDINGS.      Impressions: Abnormal VNG     Oculomotor performance demonstrated abnormal accuracy for saccades for 3 separate trials suggesting CNS " pathology.   All other oculomotor tasks were performed within normal limits.      Positional tests were normal; No dizziness reported; No BPPV    Caloric responses were symmetrical.   Positive for Failure of Fixation Suppression suggesting CNS pathology      Recommendations: Follow-up with Kaveh Reyez MD          Consider CNS pathology; cerebellar     Consider cervicogenic factors; physical therapy

## 2024-03-05 ENCOUNTER — LAB REQUISITION (OUTPATIENT)
Dept: LAB | Facility: HOSPITAL | Age: 79
End: 2024-03-05
Payer: MEDICARE

## 2024-03-12 ENCOUNTER — OFFICE VISIT (OUTPATIENT)
Dept: OTOLARYNGOLOGY | Facility: CLINIC | Age: 79
End: 2024-03-12
Payer: MEDICARE

## 2024-03-12 VITALS — WEIGHT: 154 LBS | HEIGHT: 71 IN | BODY MASS INDEX: 21.56 KG/M2

## 2024-03-12 DIAGNOSIS — R42 DIZZINESS AND GIDDINESS: Primary | ICD-10-CM

## 2024-03-12 DIAGNOSIS — H90.3 SENSORINEURAL HEARING LOSS (SNHL) OF BOTH EARS: ICD-10-CM

## 2024-03-12 PROCEDURE — 1036F TOBACCO NON-USER: CPT | Performed by: OTOLARYNGOLOGY

## 2024-03-12 PROCEDURE — 1159F MED LIST DOCD IN RCRD: CPT | Performed by: OTOLARYNGOLOGY

## 2024-03-12 PROCEDURE — 99214 OFFICE O/P EST MOD 30 MIN: CPT | Performed by: OTOLARYNGOLOGY

## 2024-03-12 NOTE — PROGRESS NOTES
Chief Complaint   Patient presents with    Results     LOV: 2/2024 VNG RESULTS, NOTE IN CHART      Date of Evaluation: 3/12/2024   HPI  He returns in follow-up for dizziness.    VNG showed: Abnormal VNG     Oculomotor performance demonstrated abnormal accuracy for saccades for 3 separate trials suggesting CNS pathology.   All other oculomotor tasks were performed within normal limits.      Positional tests were normal; No dizziness reported; No BPPV    Caloric responses were symmetrical.   Positive for Failure of Fixation Suppression suggesting CNS pathology  Dominguez Ross is a 78 y.o. male here for evaluation of dizziness.  9 months ago he had acute onset vertigo.  He was admitted to the hospital and kept overnight for workup that was negative.  His dizziness has improved but he still has some unsteadiness when walking around.  He does have a history of cervical disc disease.  This has been less symptomatic lately.  He has unsteadiness every day.  No vertigo at rest.  His audiogram shows bilateral mid to high-frequency sensorineural hearing loss with slight asymmetry with the left ear worse than right in the mid frequencies.  He had noise exposure working at [employer] for 30 years.  He wore hearing protection.       Past Medical History:   Diagnosis Date    Abnormal finding of blood chemistry, unspecified 12/30/2013    Abnormal blood chemistry    Abnormal levels of other serum enzymes 03/10/2020    Elevated CPK    Abrasion of left upper arm, initial encounter 09/04/2019    Arm abrasion, left, initial encounter    Acquired trigger finger 05/10/2023    Acute upper respiratory infection, unspecified 12/29/2017    Acute upper respiratory infection    Autoimmune thyroiditis     Hashimoto's thyroiditis    Bilateral shoulder pain 05/10/2023    Colon adenomas 05/10/2023    Elevated CPK 08/17/2022    Encounter for general adult medical examination without abnormal findings 11/17/2017    Annual physical exam     Hemorrhage of anus and rectum 04/03/2019    Bright red rectal bleeding    Myalgia, unspecified site     Muscular aches    Neck pain on left side 05/10/2023    Nuclear senile cataract 07/24/2018    Formatting of this note might be different from the original. Added automatically from request for surgery [id number] Formatting of this note might be different from the original. Added automatically from request for surgery [id number]    Occlusion and stenosis of bilateral carotid arteries 02/18/2023    Formatting of this note might be different from the original. Mild Test done at  2/2023    Other conditions influencing health status     Inflammatory Myopathy (Myositis)    Other conditions influencing health status     Nephrolithiasis Of The Right Kidney    Other conditions influencing health status     Induced CPK Elevation    Other conditions influencing health status 01/20/2016    History of cough    Other conditions influencing health status 11/17/2017    Chronic steroid use    Other disorders of iron metabolism     Iron overload    Pain in unspecified joint     Arthralgia of multiple sites    Personal history of other (healed) physical injury and trauma 11/03/2017    History of insect bite    Personal history of other diseases of male genital organs 10/02/2012    History of benign prostatic hypertrophy    Personal history of other diseases of the digestive system     History of hemorrhoids    Personal history of other diseases of the musculoskeletal system and connective tissue     History of polymyalgia rheumatica    Personal history of other diseases of the nervous system and sense organs     History of carpal tunnel syndrome    Personal history of other diseases of the nervous system and sense organs     History of peripheral neuropathy    Personal history of other diseases of the respiratory system 05/01/2018    History of acute sinusitis    Personal history of other diseases of the respiratory system 11/12/2015     "History of acute pharyngitis    Personal history of other diseases of the respiratory system 03/09/2016    History of acute bronchitis    Personal history of other drug therapy 11/15/2016    History of influenza vaccination    Personal history of other endocrine, nutritional and metabolic disease     History of hypercholesterolemia    Personal history of other endocrine, nutritional and metabolic disease 11/17/2017    History of vitamin D deficiency    Personal history of other specified conditions     History of abdominal pain    Personal history of other specified conditions 02/12/2013    History of chest pain    Polyneuropathy, unspecified     Polyneuropathy    Skin change 05/10/2023    Sprain of joints and ligaments of other parts of neck, initial encounter     Sprain of joints and ligaments of other parts of neck    Supraventricular tachycardia 12/16/2019      Past Surgical History:   Procedure Laterality Date    BACK SURGERY  08/02/2012    Lower Back Surgery    COLONOSCOPY  10/29/2012    Complete Colonoscopy    MR HEAD ANGIO WO IV CONTRAST  5/24/2023    MR HEAD ANGIO WO IV CONTRAST [facility]    OTHER SURGICAL HISTORY  08/02/2012    Biopsy Muscle          Medications:   Current Outpatient Medications   Medication Instructions    aspirin 81 mg, oral, Daily RT    folic acid (FOLVITE) 1 mg, oral, Daily RT    levothyroxine (SYNTHROID, LEVOXYL) 88 mcg, oral, Daily before breakfast    methotrexate (TREXALL) 10 mg, oral, Weekly    metoprolol succinate XL (TOPROL-XL) 50 mg, oral, Daily    predniSONE (DELTASONE) 2.5 mg, oral, Daily RT    tamsulosin (FLOMAX) 0.4 mg, oral, Daily        Allergies:  Allergies   Allergen Reactions    Statins-Hmg-Coa Reductase Inhibitors Other        Physical Exam:  Last Recorded Vitals  Height 1.803 m (5' 11\"), weight 69.9 kg (154 lb).  []General appearance: Well-developed, well-nourished in no acute distress, conversant with normal voice quality    Head/face: No erythema or edema " or facial tenderness, and normal facial nerve function bilaterally    External ear: Clear external auditory canals with normal pinnae  Tube status: N/A  Middle ear: Tympanic membranes intact and mobile, middle ears normal.  Tympanic membrane perforation: N/A  Mastoid bowl: N/A  Hearing: Normal conversational awareness at normal speech thresholds    Nose visualized using: Anterior rhinoscopy  Nasal dorsum: Nontraumatic midline appearance  Septum: Midline, nonobstructing  Inferior turbinates: Normal, pink  Secretions: Dry    Oral cavity and oropharynx: Normal  Teeth: Good condition  Floor of mouth: without lesions  Palate: Normal hard palate, soft palate and uvula  Oropharynx: Clear, no lesions present  Buccal mucosa: Normal without masses or lesions  Lips: Normal    Nasopharynx: Inadequate mirror exam secondary to gag/anatomy    Neck:  Salivary glands: Normal bilateral parotid and submandibular glands by inspection and palpation.  Non-thyroid masses: No palpable masses or significant lymphadenopathy  Trachea: Midline  Thyroid: No thyromegaly or palpable nodules  Temporomandibular joint: Nontender  Cervical range of motion: Normal    Neurologic exam: Alert and oriented x3, appropriate affect.  Cranial nerves II-XII normal bilaterally  Extraocular movement: Extraocular movement intact, normal gaze alignment        Dominguez was seen today for results.  Diagnoses and all orders for this visit:  Dizziness and giddiness (Primary)  Sensorineural hearing loss (SNHL) of both ears         PLAN  I have recommended he move forward with physical therapy to address central VNG abnormalities and cervical problems.  Follow-up with me if he is not better.  A hearing aid evaluation is advisable    Kaveh Reyez MD

## 2024-03-13 LAB
LABORATORY COMMENT REPORT: NORMAL
PATH REPORT.FINAL DX SPEC: NORMAL
PATH REPORT.GROSS SPEC: NORMAL
PATH REPORT.RELEVANT HX SPEC: NORMAL
PATH REPORT.TOTAL CANCER: NORMAL

## 2024-03-13 NOTE — RESULT ENCOUNTER NOTE
The polyp removed during your colonoscopy is an adenoma which is benign, but has the potential to turn into cancer if not removed.  Your polyp was completely removed. Due to your age and the small polyp removed, I do not recommend any further surveillance colonoscopies.  If you were to develop any change in bowel habits, rectal bleeding, or abdominal pain a colonoscopy may be recommended at that time.    Please call the office if you have any questions or concerns.

## 2024-03-26 ENCOUNTER — EVALUATION (OUTPATIENT)
Dept: PHYSICAL THERAPY | Facility: CLINIC | Age: 79
End: 2024-03-26
Payer: MEDICARE

## 2024-03-26 DIAGNOSIS — R42 DIZZINESS: Primary | ICD-10-CM

## 2024-03-26 DIAGNOSIS — R42 DIZZINESS AND GIDDINESS: ICD-10-CM

## 2024-03-26 DIAGNOSIS — M54.2 NECK PAIN: ICD-10-CM

## 2024-03-26 DIAGNOSIS — R26.81 UNSTEADINESS ON FEET: ICD-10-CM

## 2024-03-26 PROCEDURE — 97530 THERAPEUTIC ACTIVITIES: CPT | Mod: GP | Performed by: PHYSICAL THERAPIST

## 2024-03-26 PROCEDURE — 97162 PT EVAL MOD COMPLEX 30 MIN: CPT | Mod: GP | Performed by: PHYSICAL THERAPIST

## 2024-03-26 ASSESSMENT — PAIN SCALES - GENERAL
PAINLEVEL_OUTOF10: 6
PAINLEVEL_OUTOF10: 3

## 2024-03-26 ASSESSMENT — PAIN - FUNCTIONAL ASSESSMENT: PAIN_FUNCTIONAL_ASSESSMENT: 0-10

## 2024-03-26 NOTE — PROGRESS NOTES
Vestibular Evaluation    Patient Name: Dominguez Ross  MRN: [id number]  Today's Date: 03/26/24  Moderate complexity due to patient's clinical presentation being evolving with changing characteristics, with comorbidities/complexities to include chronic neck pain; peripheral neuropathy; , all of which may negatively impact rehab tolerance and progression.  Time Calculation  Start Time: 0935  Stop Time: 1018  Time Calculation (min): 43 min  PT Therapeutic Procedures Time Entry  Therapeutic Activity Time Entry: 13     Insurance:  Visit number: Rama of 8  Authorization info: NO AUTH / [place]   Insurance Type: Payor: MEDICARE / Plan: MEDICARE PART A AND B / Product Type: *No Product type* /     Current Problem:   1. Dizziness  Follow Up In Physical Therapy      2. Neck pain  Follow Up In Physical Therapy      3. Unsteadiness on feet  Follow Up In Physical Therapy      4. Dizziness and giddiness  Referral to Physical Therapy          Subjective   General Visit Information:  General  Reason for Referral: Dizziness  Referred By: Dr. Reyez  Past Medical History Relevant to Rehab: B neuropathy; cervical history; lumbar surgery '99  General Comment: Pt presents to therapy with complications from dizziness. He reports that last year he was in PT for neck issues which did help a little bit. He went to see Dr. Reyez due to dizziness had a VNG done showing central problem. He went to see pain mangement after seeing PT for neck issues who did give him a steroid injection which helped with the neck pain however dizziness remained. He reports initially when he had vertigo he came to ER and than had a hard time getting an appointemtn with an ENT. He reports after the VNG testing he did find some relief. He reports that if he takes advil it will help his neck pain and even reduces his dizziness. He reports last may he woke up with the dizziness that lasted for a couple hours. He does not report any spinning sensation but there is a  sensation of dizziness. He reports neck pain when he turns toward the L with L side neck pain. He does not report any headaches or sensitivity to light. He does not report falls but there is imbalance when he bends over as he feels that he wants to fall backwards. He reports there is no dizziness when he is sitting but once he gets up and moves there is dizziness.  Precautions:  Precautions  Precautions Comment: Falls  Pain:  Pain Assessment  Pain Assessment: 0-10  Pain Score: 3  Pain Type: Chronic pain  Pain Location: Neck  Pain Orientation: Left  Pain 2  Pain Score 2: 6 (Dizziness)    Prior Level of Function:  Level of [place]: Independent with ADLs and functional transfers    Objective     Neuro Oculomotor:  Range of Motion: Normal  Smooth Pursuit: Normal  Horizontal Saccades: Other: (comment) (VNG demonstrated abnormality; no symptoms this date)  Vertical Saccades: Other: (comment) (VNG demonstrated abnormality; no symptom increase this date)  Eye Alignment : Normal  Distraction Cover: Normal  Distraction Uncover: Normal  Neuro Vestibular:  Horizontal VOR: Other: (comment) (No nystagmus; pt unable to to report if symptom increase appear to be slight amount)  Vertical VOR: Other: (comment) (No nystagmus; pt unable to to report if symptom increase appear to be slight amount)  Positional Testing:  Positional Testing Comment: Per subjective report does not appear to be position, VNG testing also did not demonstrate a positive finding will assess as needed    Balance:   Firm surface:   Romberg EO and EC increased sway but able to maintain  Tandem Able to get into position however unable to maintain for more than 3 seconds  SLS WNL B  Foam surface:  Romberg EC large sway posterior lateral with LOB     Extremities Assessment: Cervical Spine    Observation  Cervical Posture: slight forward    Cervical AROM  Cervical flexion: (80°): 60  Cervical extension: (50°): 30  Cervical rotation right: (80°): 55  Cervical  rotation left: (80°): 40    Outcome Measures:  Other Measures  Dizziness Handicap Inventory: 26  Dynamic Gait Index (DGI): Modified 4 point: 9/12     Treatments:  Therapeutic Activity  Therapeutic Activity Performed: Yes  Therapeutic Activity 1: Educated on vestibular and balance systems in relation to current findings.  Therapeutic Activity 2: Educated on cervicogenic dizziness    OP EDUCATION:  Outpatient Education  Individual(s) Educated: Patient  Education Provided: Anatomy, Body Mechanics, Home Exercise Program, Fall Risk, POC  Risk and Benefits Discussed with Patient/Caregiver/Other: yes  Patient/Caregiver Demonstrated Understanding: yes  Plan of Care Discussed and Agreed Upon: yes  Patient Response to Education: Patient/Caregiver Verbalized Understanding of Information, Patient/Caregiver Performed Return Demonstration of Exercises/Activities, Patient/Caregiver Asked Appropriate Questions    Assessment   Assessment:   PT Assessment Results: Pain, Decreased range of motion, Impaired balance (Dizziness)  Rehab Prognosis: Good  Assessment Comment: Pt is a 78 y.o male presenting to therapy with chonic dizziness and neck pain. He did not report any increase in symptoms during testing however noted large functional deficit during DGI and romberg assessment especially with posterior displacement and lateral sway creating fall risk. Pt does have unequal neck rotation which could be contributing to dizziness symptoms however at this time cheif complaint does appear to be multifactoral and pt would benefit from therapy interventions including vestibular and balance training and cervical ROM improvement without pt is a further risk of functional decline.      Plan:  Treatment/Interventions: Canalith repositioning, Dry needling, Education/ Instruction, Gait training, Hot pack, Manual therapy, Mechanical traction, Neuromuscular re-education, Taping techniques, Therapeutic activities, Therapeutic exercises  PT Plan: Skilled  PT  PT Frequency: 1 time per week  Duration: 9 weeks or 8 visits + eval  Onset Date: 03/26/24  Certification Period Start Date: 03/26/24  Certification Period End Date: 06/24/24  Number of Treatments Authorized: [place]  Rehab Potential: Good  Plan of Care Agreement: Patient    Goals:  Active       PT Problem       PT Goal 1       Start:  03/26/24    Expected End:  05/05/24       Pt will be 50% IND with HEP in 4 weeks in order to progress with therapy.          PT Goal 2 - Personal Goal       Start:  03/26/24    Expected End:  05/05/24       Pt will demonstrate equal cervical rotation without pain in 4 weeks to return to swimming and reduce cervicogenic involvement with dizziness.         PT Goal 3       Start:  03/26/24    Expected End:  05/05/24       Pt will improve 4 point DGI score to 12/12 in 4 weeks in order to reduce fall risk.          PT Goal 4       Start:  03/26/24    Expected End:  05/05/24       Pt will report a reduction in dizziness score to 2/10 in 4 weeks in order to improve reduce dizziness             PT Problem       PT Goal 1       Start:  03/26/24    Expected End:  06/04/24       Pt will be 100% IND with HEP in 8 weeks in order to maintain progress with therapy.           PT Goal 2       Start:  03/26/24    Expected End:  06/04/24       Pt will be able to perform community ambulation demonstrating normalized gait pattern with sway or path deviations in 8 weeks for recreational ambulation and a reduction in fall risk.          PT Goal 3       Start:  03/26/24    Expected End:  06/04/24       Pt will demonstrate subjective improvement of ADLs and recreational activities through improved score of 0 on DHI in 8 weeks.

## 2024-04-04 ENCOUNTER — TREATMENT (OUTPATIENT)
Dept: PHYSICAL THERAPY | Facility: CLINIC | Age: 79
End: 2024-04-04
Payer: MEDICARE

## 2024-04-04 DIAGNOSIS — R26.81 UNSTEADINESS ON FEET: ICD-10-CM

## 2024-04-04 DIAGNOSIS — M54.2 NECK PAIN: ICD-10-CM

## 2024-04-04 DIAGNOSIS — R42 DIZZINESS: ICD-10-CM

## 2024-04-04 PROCEDURE — 97110 THERAPEUTIC EXERCISES: CPT | Mod: GP,CQ

## 2024-04-04 PROCEDURE — 97112 NEUROMUSCULAR REEDUCATION: CPT | Mod: GP,CQ

## 2024-04-04 ASSESSMENT — PAIN - FUNCTIONAL ASSESSMENT: PAIN_FUNCTIONAL_ASSESSMENT: 0-10

## 2024-04-04 ASSESSMENT — PAIN SCALES - GENERAL: PAINLEVEL_OUTOF10: 0 - NO PAIN

## 2024-04-04 NOTE — PROGRESS NOTES
"Physical Therapy Treatment    Patient Name: Dominguez Ross  MRN: [id number]  Today's Date: 4/4/2024  Time Calculation  Start Time: 1315  Stop Time: 1355  Time Calculation (min): 40 min  PT Therapeutic Procedures Time Entry  Neuromuscular Re-Education Time Entry: 25  Therapeutic Exercise Time Entry: 15    Insurance:  Visit number: 1 of 8  Authorization info: NO AUTH / [place]   Insurance Type: Payor: MEDICARE / Plan: MEDICARE PART A AND B / Product Type: *No Product type* /     Current Problem   1. Dizziness  Follow Up In Physical Therapy      2. Neck pain  Follow Up In Physical Therapy      3. Unsteadiness on feet  Follow Up In Physical Therapy          Subjective   General    Patient states he has intermittent neck pain today, states he has been performing HEP for ~1 week now and reports sometimes it feels like he does too many of them and his neck starts to hurt. Reports dizziness in intermittent as well and experiences the most dizziness when he bends down.     Precautions:   Fall risk     Pain   Pain Assessment: 0-10  Pain Score: 0 - No pain  Pain Location: Neck    Post Treatment Pain Level n/a    Objective   Posterior sway with romberg stance    Exacerbation of dizziness with head turns - able to recover in ~2'    Treatments:  Therapeutic Exercise:  Therapeutic Exercise  Therapeutic Exercise Performed: Yes  Therapeutic Exercise Activity 1: seated UT stretch with distraction and gentle OP 3 x 20\" H B  Therapeutic Exercise Activity 2: seated LS stretch 3 x 20\" H B  Therapeutic Exercise Activity 3: seated chin tuck 10x2    Neuro Re-ed:   Balance/Neuromuscular Re-Education  Balance/Neuromuscular Re-Education Activity Performed: Yes  Balance/Neuromuscular Re-Education Activity 1: romberg stance 3 x 30\"  Balance/Neuromuscular Re-Education Activity 2: romberg with head turns 3 x 30\"  Balance/Neuromuscular Re-Education Activity 3: romberg with head nods 3 x 30\"  Balance/Neuromuscular Re-Education Activity 4: romberg " "VOR cancellation 3 x 30\"  Balance/Neuromuscular Re-Education Activity 5: romberg stance on air ex with EC 3 x 30\"      Assessment   Assessment:    Emphasis today on cervical movement as well as mild stability, mainly focused on balance tasks this date with vestibular head movements. Challenged by head nods in romberg with increased dizziness as well as romberg EC on air ex with posterior and L lateral LOB.    Plan:    Generalized balance, visual tracking, cervical movement, cervical stability.    OP EDUCATION:   Review of HEP    Goals:   Active       PT Problem       PT Goal 1       Start:  03/26/24    Expected End:  05/05/24       Pt will be 50% IND with HEP in 4 weeks in order to progress with therapy.          PT Goal 2 - Personal Goal       Start:  03/26/24    Expected End:  05/05/24       Pt will demonstrate equal cervical rotation without pain in 4 weeks to return to swimming and reduce cervicogenic involvement with dizziness.         PT Goal 3       Start:  03/26/24    Expected End:  05/05/24       Pt will improve 4 point DGI score to 12/12 in 4 weeks in order to reduce fall risk.          PT Goal 4       Start:  03/26/24    Expected End:  05/05/24       Pt will report a reduction in dizziness score to 2/10 in 4 weeks in order to improve reduce dizziness             PT Problem       PT Goal 1       Start:  03/26/24    Expected End:  06/04/24       Pt will be 100% IND with HEP in 8 weeks in order to maintain progress with therapy.           PT Goal 2       Start:  03/26/24    Expected End:  06/04/24       Pt will be able to perform community ambulation demonstrating normalized gait pattern with sway or path deviations in 8 weeks for recreational ambulation and a reduction in fall risk.          PT Goal 3       Start:  03/26/24    Expected End:  06/04/24       Pt will demonstrate subjective improvement of ADLs and recreational activities through improved score of 0 on DHI in 8 weeks.                "

## 2024-04-08 ENCOUNTER — TREATMENT (OUTPATIENT)
Dept: PHYSICAL THERAPY | Facility: CLINIC | Age: 79
End: 2024-04-08
Payer: MEDICARE

## 2024-04-08 DIAGNOSIS — M54.2 NECK PAIN: ICD-10-CM

## 2024-04-08 DIAGNOSIS — R42 DIZZINESS: ICD-10-CM

## 2024-04-08 DIAGNOSIS — R26.81 UNSTEADINESS ON FEET: ICD-10-CM

## 2024-04-08 PROCEDURE — 97112 NEUROMUSCULAR REEDUCATION: CPT | Mod: GP,CQ

## 2024-04-08 PROCEDURE — 97110 THERAPEUTIC EXERCISES: CPT | Mod: GP,CQ

## 2024-04-08 ASSESSMENT — PAIN - FUNCTIONAL ASSESSMENT: PAIN_FUNCTIONAL_ASSESSMENT: 0-10

## 2024-04-08 ASSESSMENT — PAIN SCALES - GENERAL: PAINLEVEL_OUTOF10: 0 - NO PAIN

## 2024-04-08 NOTE — PROGRESS NOTES
"Physical Therapy Treatment    Patient Name: Dominguez Ross  MRN: [id number]  Today's Date: 4/8/2024  Time Calculation  Start Time: 1010  Stop Time: 1050  Time Calculation (min): 40 min  PT Therapeutic Procedures Time Entry  Neuromuscular Re-Education Time Entry: 15  Therapeutic Exercise Time Entry: 25    Insurance:  Visit number: 2 of 8  Authorization info: NO AUTH / [place]   Insurance Type: Payor: MEDICARE / Plan: MEDICARE PART A AND B / Product Type: *No Product type* /     Current Problem   1. Dizziness  Follow Up In Physical Therapy      2. Neck pain  Follow Up In Physical Therapy      3. Unsteadiness on feet  Follow Up In Physical Therapy          Subjective   General    Patient reports he doesn't have any dizziness, however he was was gardening yesterday and every time he stood up from kneeling he felt unsteady. No neck pain this morning.    Precautions:   Falls     Pain   Pain Assessment: 0-10  Pain Score: 0 - No pain    Post Treatment Pain Level   0/10    Objective   LOB with romberg, EC, head turns     Posture: FFP with rounded shoulders, fwd head, and winging to bilat scaps    Treatments:  Therapeutic Exercise:  Therapeutic Exercise  Therapeutic Exercise Performed: Yes  Therapeutic Exercise Activity 1: UBE retro 5' bwd  Therapeutic Exercise Activity 2: seated LS stretch 3 x 30\" H B (shoulder distraction)  Therapeutic Exercise Activity 3: seated UT stretch with distraction and gentle OP 3 x 30\" H B  Therapeutic Exercise Activity 4: standing ISO chin tuck 5\" H 10x3  Therapeutic Exercise Activity 5: standing scap squeezes PTB 10x3    Neuro Re-ed:   Balance/Neuromuscular Re-Education  Balance/Neuromuscular Re-Education Activity Performed: Yes  Balance/Neuromuscular Re-Education Activity 1: romberg with head turns 3 x 30\"  Balance/Neuromuscular Re-Education Activity 2: romberg with head nods 3 x 30\"  Balance/Neuromuscular Re-Education Activity 3: romberg EC head turns 3 x 30\"  Balance/Neuromuscular " Re-Education Activity 4: romber>tandom alt 10x2      Assessment   Assessment:    Continued focus on cervical and scapular stability as well as head movement/ROM. Also focused on vestibular functional tasks as well as generalized balance. LOB with NBOS positions, slight increased dizziness with head turns but able to recover under 1 min each time.     Plan:    Cervical and scapular stability, balance    OP EDUCATION:   Review of HEP    Goals:   Active       PT Problem       PT Goal 1       Start:  03/26/24    Expected End:  05/05/24       Pt will be 50% IND with HEP in 4 weeks in order to progress with therapy.          PT Goal 2 - Personal Goal       Start:  03/26/24    Expected End:  05/05/24       Pt will demonstrate equal cervical rotation without pain in 4 weeks to return to swimming and reduce cervicogenic involvement with dizziness.         PT Goal 3       Start:  03/26/24    Expected End:  05/05/24       Pt will improve 4 point DGI score to 12/12 in 4 weeks in order to reduce fall risk.          PT Goal 4       Start:  03/26/24    Expected End:  05/05/24       Pt will report a reduction in dizziness score to 2/10 in 4 weeks in order to improve reduce dizziness             PT Problem       PT Goal 1       Start:  03/26/24    Expected End:  06/04/24       Pt will be 100% IND with HEP in 8 weeks in order to maintain progress with therapy.           PT Goal 2       Start:  03/26/24    Expected End:  06/04/24       Pt will be able to perform community ambulation demonstrating normalized gait pattern with sway or path deviations in 8 weeks for recreational ambulation and a reduction in fall risk.          PT Goal 3       Start:  03/26/24    Expected End:  06/04/24       Pt will demonstrate subjective improvement of ADLs and recreational activities through improved score of 0 on DHI in 8 weeks.

## 2024-04-18 ENCOUNTER — TREATMENT (OUTPATIENT)
Dept: PHYSICAL THERAPY | Facility: CLINIC | Age: 79
End: 2024-04-18
Payer: MEDICARE

## 2024-04-18 DIAGNOSIS — M54.2 NECK PAIN: ICD-10-CM

## 2024-04-18 DIAGNOSIS — R42 DIZZINESS: ICD-10-CM

## 2024-04-18 DIAGNOSIS — R26.81 UNSTEADINESS ON FEET: ICD-10-CM

## 2024-04-18 PROCEDURE — 97112 NEUROMUSCULAR REEDUCATION: CPT | Mod: GP,CQ

## 2024-04-18 PROCEDURE — 97110 THERAPEUTIC EXERCISES: CPT | Mod: GP,CQ

## 2024-04-18 PROCEDURE — 97140 MANUAL THERAPY 1/> REGIONS: CPT | Mod: GP,CQ

## 2024-04-18 ASSESSMENT — PAIN SCALES - GENERAL: PAINLEVEL_OUTOF10: 0 - NO PAIN

## 2024-04-18 ASSESSMENT — PAIN - FUNCTIONAL ASSESSMENT: PAIN_FUNCTIONAL_ASSESSMENT: 0-10

## 2024-04-18 NOTE — PROGRESS NOTES
"Physical Therapy Treatment    Patient Name: Dominguez Ross  MRN: [id number]  Today's Date: 4/18/2024  Time Calculation  Start Time: 1104  Stop Time: 1144  Time Calculation (min): 40 min  PT Therapeutic Procedures Time Entry  Manual Therapy Time Entry: 10  Neuromuscular Re-Education Time Entry: 15  Therapeutic Exercise Time Entry: 15    Insurance:  Visit number: 3 of 8  Authorization info: NO AUTH / [place]   Insurance Type: Payor: MEDICARE / Plan: MEDICARE PART A AND B / Product Type: *No Product type* /     Current Problem   1. Dizziness  Follow Up In Physical Therapy      2. Neck pain  Follow Up In Physical Therapy      3. Unsteadiness on feet  Follow Up In Physical Therapy          Subjective   General    Patient reports no significant changes since previous session, states he does not see any improvement in neck pain/stiffness and reports inconsistencies with dizziness. Stating some days he he fine but randomly he has days he feels very off steady.     Precautions:   Falls     Pain   Pain Assessment: 0-10  Pain Score: 0 - No pain    Post Treatment Pain Level   0/10    Objective   Weakness to cervical and scapular retractors/stabilizers    Posterior and lateral LOB with narrow CATHY and foam pad tasks with therapist support to recover    Fair- dynamic standing balance    Treatments:  Therapeutic Exercise:  Therapeutic Exercise  Therapeutic Exercise Performed: Yes  Therapeutic Exercise Activity 1: UBE retro 5' bwd  Therapeutic Exercise Activity 2: seated UT stretch with distraction and gentle OP 3 x 30\" H B  Therapeutic Exercise Activity 3: standing ISO chin tuck with ball 5\" H 10x3  Therapeutic Exercise Activity 4: standing chin tuck with hrz abd scap retract GTB 10x2  Therapeutic Exercise Activity 5: standing scap rows GTB 10x3    Neuro Re-ed:   Balance/Neuromuscular Re-Education  Balance/Neuromuscular Re-Education Activity Performed: Yes  Balance/Neuromuscular Re-Education Activity 1: romberg foam pad head " turns  Balance/Neuromuscular Re-Education Activity 2: romberg foam pad head nods  Balance/Neuromuscular Re-Education Activity 3: foam pad romberg>tandom 10x2 ea  Balance/Neuromuscular Re-Education Activity 4: foam pad romberg EC    Manual Therapy:  Dry needling  IDN performed this date. Pt educated on risks and benefits of dry needling. Pt provided verbal consent. All universal precautions followed.    1 - 30 mm suboccipitals B  1 - 30 mm C2-5 paraspinal B    No adverse response. All IDN guidelines and safety protocols followed.  Performed by Cheri Coronel PT DPT    Assessment   Assessment:    Patient is progressing towards goals, tolerated session well this date with good performance of scapular and cervical strength and stability tasks. Little/no dizziness during neuro tasks but LOB present with all challenge of surface and CATHY, able to stabilize with therapist assist.      Plan:    Balance, visual tracking, cervical and scapular stability    OP EDUCATION:   Use of heat post dry needling    Goals:   Active       PT Problem       PT Goal 1       Start:  03/26/24    Expected End:  05/05/24       Pt will be 50% IND with HEP in 4 weeks in order to progress with therapy.          PT Goal 2 - Personal Goal       Start:  03/26/24    Expected End:  05/05/24       Pt will demonstrate equal cervical rotation without pain in 4 weeks to return to swimming and reduce cervicogenic involvement with dizziness.         PT Goal 3       Start:  03/26/24    Expected End:  05/05/24       Pt will improve 4 point DGI score to 12/12 in 4 weeks in order to reduce fall risk.          PT Goal 4       Start:  03/26/24    Expected End:  05/05/24       Pt will report a reduction in dizziness score to 2/10 in 4 weeks in order to improve reduce dizziness             PT Problem       PT Goal 1       Start:  03/26/24    Expected End:  06/04/24       Pt will be 100% IND with HEP in 8 weeks in order to maintain progress with therapy.           PT  Goal 2       Start:  03/26/24    Expected End:  06/04/24       Pt will be able to perform community ambulation demonstrating normalized gait pattern with sway or path deviations in 8 weeks for recreational ambulation and a reduction in fall risk.          PT Goal 3       Start:  03/26/24    Expected End:  06/04/24       Pt will demonstrate subjective improvement of ADLs and recreational activities through improved score of 0 on DHI in 8 weeks.

## 2024-04-25 ENCOUNTER — TREATMENT (OUTPATIENT)
Dept: PHYSICAL THERAPY | Facility: CLINIC | Age: 79
End: 2024-04-25
Payer: MEDICARE

## 2024-04-25 DIAGNOSIS — M54.2 NECK PAIN: ICD-10-CM

## 2024-04-25 DIAGNOSIS — R42 DIZZINESS: ICD-10-CM

## 2024-04-25 DIAGNOSIS — R26.81 UNSTEADINESS ON FEET: ICD-10-CM

## 2024-04-25 PROCEDURE — 97110 THERAPEUTIC EXERCISES: CPT | Mod: GP,CQ

## 2024-04-25 PROCEDURE — 97112 NEUROMUSCULAR REEDUCATION: CPT | Mod: GP,CQ

## 2024-04-25 ASSESSMENT — PAIN SCALES - GENERAL: PAINLEVEL_OUTOF10: 0 - NO PAIN

## 2024-04-25 ASSESSMENT — PAIN - FUNCTIONAL ASSESSMENT: PAIN_FUNCTIONAL_ASSESSMENT: 0-10

## 2024-04-25 NOTE — PROGRESS NOTES
"Physical Therapy Treatment    Patient Name: Dominguez Ross  MRN: [id number]  Today's Date: 4/25/2024  Time Calculation  Start Time: 1100  Stop Time: 1142  Time Calculation (min): 42 min  PT Therapeutic Procedures Time Entry  Neuromuscular Re-Education Time Entry: 23  Therapeutic Exercise Time Entry: 19    Insurance:  Visit number: 4 of 8  Authorization info: NO AUTH / [place]   Insurance Type: Payor: MEDICARE / Plan: MEDICARE PART A AND B / Product Type: *No Product type* /     Current Problem   1. Dizziness  Follow Up In Physical Therapy      2. Neck pain  Follow Up In Physical Therapy      3. Unsteadiness on feet  Follow Up In Physical Therapy          Subjective   General    Patient reports no significant changes since last session, states he continues to have continuous neck soreness. Did get dizzy occasionally when he was working in the yard yesterday which required a lot of bending down and bending over. Dizziness only occurs sometimes when he bends over, does not get dizzy when he turns over in bed or when he sits up in the morning. Reports that he will lose his balance posteriorly and then gets \"woozy\".     Precautions:   None    Pain   Pain Assessment: 0-10  Pain Score: 0 - No pain    Post Treatment Pain Level   0/10    Objective   Poor posterior ankle strategy     Treatments:  Therapeutic Exercise:  Therapeutic Exercise  Therapeutic Exercise Performed: Yes  Therapeutic Exercise Activity 1: UBE retro 5' bwd  Therapeutic Exercise Activity 2: UT stretch shld distraction 3 x 45\" H  Therapeutic Exercise Activity 3: standing resisted cervical retraction GTB 10x3  Therapeutic Exercise Activity 4: standing scapular rows GTB 10x3    Neuro Re-ed:   Balance/Neuromuscular Re-Education  Balance/Neuromuscular Re-Education Activity Performed: Yes  Balance/Neuromuscular Re-Education Activity 1: A/P weight shift  Balance/Neuromuscular Re-Education Activity 2: EC pertubations all directions  Balance/Neuromuscular " Re-Education Activity 3: slant board A/P weight shifts      Assessment   Assessment:    Patient continues to lack ankle strategy with LOB both posteriorly and L laterally. Compensatory movements from trunk with posterior weight shifting. Verbal, tactile, and visual cues for proper ankle strategies with little/no carry over.      Plan:   Balance- ankle strategies     OP EDUCATION:   Updated HEP- Access Code: [email or web address]    Goals:   Active       PT Problem       PT Goal 1       Start:  03/26/24    Expected End:  05/05/24       Pt will be 50% IND with HEP in 4 weeks in order to progress with therapy.          PT Goal 2 - Personal Goal       Start:  03/26/24    Expected End:  05/05/24       Pt will demonstrate equal cervical rotation without pain in 4 weeks to return to swimming and reduce cervicogenic involvement with dizziness.         PT Goal 3       Start:  03/26/24    Expected End:  05/05/24       Pt will improve 4 point DGI score to 12/12 in 4 weeks in order to reduce fall risk.          PT Goal 4       Start:  03/26/24    Expected End:  05/05/24       Pt will report a reduction in dizziness score to 2/10 in 4 weeks in order to improve reduce dizziness             PT Problem       PT Goal 1       Start:  03/26/24    Expected End:  06/04/24       Pt will be 100% IND with HEP in 8 weeks in order to maintain progress with therapy.           PT Goal 2       Start:  03/26/24    Expected End:  06/04/24       Pt will be able to perform community ambulation demonstrating normalized gait pattern with sway or path deviations in 8 weeks for recreational ambulation and a reduction in fall risk.          PT Goal 3       Start:  03/26/24    Expected End:  06/04/24       Pt will demonstrate subjective improvement of ADLs and recreational activities through improved score of 0 on DHI in 8 weeks.

## 2024-04-29 ENCOUNTER — DOCUMENTATION (OUTPATIENT)
Dept: PHYSICAL THERAPY | Facility: CLINIC | Age: 79
End: 2024-04-29
Payer: MEDICARE

## 2024-04-29 ENCOUNTER — APPOINTMENT (OUTPATIENT)
Dept: PHYSICAL THERAPY | Facility: CLINIC | Age: 79
End: 2024-04-29
Payer: MEDICARE

## 2024-04-29 NOTE — PROGRESS NOTES
Physical Therapy                 Therapy Communication Note    Patient Name: Dominguez Ross  MRN: [id number]  Today's Date: 4/29/2024     Discipline: Physical Therapy    Missed Visit Reason:  PT called in to cx due to illness     Missed Time: Cancel    Comment:

## 2024-05-07 ENCOUNTER — TREATMENT (OUTPATIENT)
Dept: PHYSICAL THERAPY | Facility: CLINIC | Age: 79
End: 2024-05-07
Payer: MEDICARE

## 2024-05-07 DIAGNOSIS — M54.2 NECK PAIN: ICD-10-CM

## 2024-05-07 DIAGNOSIS — R26.81 UNSTEADINESS ON FEET: ICD-10-CM

## 2024-05-07 DIAGNOSIS — R42 DIZZINESS: ICD-10-CM

## 2024-05-07 PROCEDURE — 97530 THERAPEUTIC ACTIVITIES: CPT | Mod: GP | Performed by: PHYSICAL THERAPIST

## 2024-05-07 ASSESSMENT — PAIN SCALES - GENERAL: PAINLEVEL_OUTOF10: 2

## 2024-05-07 ASSESSMENT — PAIN - FUNCTIONAL ASSESSMENT: PAIN_FUNCTIONAL_ASSESSMENT: 0-10

## 2024-05-07 NOTE — PROGRESS NOTES
"Physical Therapy Treatment    Patient Name: Dominguez Ross  MRN: [id number]  Today's Date: 5/7/2024  Time Calculation  Start Time: 1245  Stop Time: 1323  Time Calculation (min): 38 min  PT Therapeutic Procedures Time Entry  Therapeutic Activity Time Entry: 38    Insurance:  Visit number: 5 of 8  Authorization info: NO AUTH / [place]   Insurance Type: Payor: MEDICARE / Plan: MEDICARE PART A AND B / Product Type: *No Product type* /     Current Problem   1. Dizziness  Follow Up In Physical Therapy      2. Neck pain  Follow Up In Physical Therapy      3. Unsteadiness on feet  Follow Up In Physical Therapy          Subjective   General   General Comment: Pt is recovering from a cold and has been on antibiotics. He feels that he has overdone the cervical stretches with increased soreness, he has stopped doing them for now while his neck is recovering. He got slightly woozy the morning after he overstretched but no \"episodes\" to report.  Precautions:  Precautions  Precautions Comment: Falls  Pain   Pain Assessment: 0-10  Pain Score: 2  Pain Location: Neck  Pain Orientation: Right  Post Treatment Pain Level 2    Objective   Vestibular:  Smooth pursuit: WNL  Saccades: H WNL, V no abnormalities except reduction in velocity  Convergence:  WNL   VOR: H noted slight reduction in gaze stabilization but able to correct and maintain without noted deficits; V WNL   Head thrust: difficulty due to cervical guarding however no symptom report.   Wendel hallpike: negative  Sidelying horizontal test: negative     DGI: 10/12 Modified  DHI 26    Balance:   Firm surface   Romberg: EO/EC without UE - WNL  Tandem: able to achieve position and hold functional length - noted LOB toward L side   SLS: WNL  Foam surface:   Romberg: EO WNL; EC LOB posterior and L; EO head turns increased sway and LOB posterior and L       Treatments:    Therapeutic activity:  Therapeutic Activity  Therapeutic Activity Performed: Yes  Therapeutic Activity 1: " Reassessment performed this date see objective measures  Therapeutic Activity 2: Educated on findings, continued deficits and POC moving forward      Assessment   Assessment:   PT Assessment  Assessment Comment: Pt did not demonstrate any abnormal findings during vestibular testing this date however did note continued functional deficits with ambulation and higher level balance tasks which are mostlikely due to weakness wtihin all 3 balance systems. Discussed with patient and will continue with overall progression/challenge.    Plan:    Continue with established POC focusing on higher level airex and ambulation tasks     OP EDUCATION:   Educated on findings this date.    Goals:   Active       PT Problem       PT Goal 1       Start:  03/26/24    Expected End:  05/05/24       Pt will be 50% IND with HEP in 4 weeks in order to progress with therapy.          PT Goal 2 - Personal Goal       Start:  03/26/24    Expected End:  05/05/24       Pt will demonstrate equal cervical rotation without pain in 4 weeks to return to swimming and reduce cervicogenic involvement with dizziness.         PT Goal 3       Start:  03/26/24    Expected End:  05/05/24       Pt will improve 4 point DGI score to 12/12 in 4 weeks in order to reduce fall risk.          PT Goal 4       Start:  03/26/24    Expected End:  05/05/24       Pt will report a reduction in dizziness score to 2/10 in 4 weeks in order to improve reduce dizziness             PT Problem       PT Goal 1       Start:  03/26/24    Expected End:  06/04/24       Pt will be 100% IND with HEP in 8 weeks in order to maintain progress with therapy.           PT Goal 2       Start:  03/26/24    Expected End:  06/04/24       Pt will be able to perform community ambulation demonstrating normalized gait pattern with sway or path deviations in 8 weeks for recreational ambulation and a reduction in fall risk.          PT Goal 3       Start:  03/26/24    Expected End:  06/04/24       Pt will  demonstrate subjective improvement of ADLs and recreational activities through improved score of 0 on DHI in 8 weeks.           Other Measures  Dizziness Handicap Inventory: 26  Dynamic Gait Index (DGI): 10/12 (Modified 4 point DGI)

## 2024-05-13 ENCOUNTER — TREATMENT (OUTPATIENT)
Dept: PHYSICAL THERAPY | Facility: CLINIC | Age: 79
End: 2024-05-13
Payer: MEDICARE

## 2024-05-13 DIAGNOSIS — R26.81 UNSTEADINESS ON FEET: ICD-10-CM

## 2024-05-13 DIAGNOSIS — M54.2 NECK PAIN: ICD-10-CM

## 2024-05-13 DIAGNOSIS — R42 DIZZINESS: ICD-10-CM

## 2024-05-13 PROCEDURE — 97112 NEUROMUSCULAR REEDUCATION: CPT | Mod: GP | Performed by: PHYSICAL THERAPIST

## 2024-05-13 PROCEDURE — 97110 THERAPEUTIC EXERCISES: CPT | Mod: GP | Performed by: PHYSICAL THERAPIST

## 2024-05-13 NOTE — PROGRESS NOTES
"Physical Therapy Treatment    Patient Name: Dominguez Ross  MRN: [id number]  Today's Date: 5/13/2024  Time Calculation  Start Time: 1054  Stop Time: 1133  Time Calculation (min): 39 min  PT Therapeutic Procedures Time Entry  Neuromuscular Re-Education Time Entry: 25  Therapeutic Exercise Time Entry: 14    Insurance:  Visit number: 6 of 8  Authorization info: NO AUTH / [place]   Insurance Type: Payor: MEDICARE / Plan: MEDICARE PART A AND B / Product Type: *No Product type* /     Current Problem   1. Dizziness  Follow Up In Physical Therapy      2. Neck pain  Follow Up In Physical Therapy      3. Unsteadiness on feet  Follow Up In Physical Therapy          Subjective   General   General Comment: Pt does not report any new changes feels better than he did last week. He does not report any falls and dizziness overall feels the same. Pt did report that he feels better when he takes advil/aleve and the dizziness reduces, questioned if the neck does bother him in these moments and he feels there is a correlation between neck discomfort and dizziness. Pt reports he hasn't done the neck exercises for a while.  Precautions:  Precautions  Precautions Comment: Falls  Pain      Post Treatment Pain Level 0    Objective   Path deviation and LOB during amb with head turns  Posterior L LOB during eyes closed     Treatments:  Therapeutic Exercise:  Therapeutic Exercise  Therapeutic Exercise Performed: Yes  Therapeutic Exercise Activity 1: chin tuck 10x3  Therapeutic Exercise Activity 2: upper trap stretch 30\"x3 B  Therapeutic Exercise Activity 3: levator scap stretch 30\"x3    Neuro Re-ed:   Balance/Neuromuscular Re-Education  Balance/Neuromuscular Re-Education Activity Performed: Yes  Balance/Neuromuscular Re-Education Activity 1: amb with head turns 10x3  Balance/Neuromuscular Re-Education Activity 2: semi-tandem VORx1 PB H 30\"x2  Balance/Neuromuscular Re-Education Activity 3: normal stance VORx1 PB H with " convergence  Balance/Neuromuscular Re-Education Activity 4: firm surface romberg EC with head turns 10x3    Assessment   Assessment:   PT Assessment  Assessment Comment: Review of cervical HEP and instructed to return to HEP for any cervicogenic dizziness components. LOB during dynamic vestibular/balance tasks this date with Esther to regain balance. Deficits with eyes closed suggestive of reduced proprioceptive awareness and potential CNS involvement. Will perform reassessment for updated POC next session.    Plan:    Re-assess next session - last appointment on the current schedule     OP EDUCATION:   Importance of HEP     Goals:   Active       PT Problem       PT Goal 1       Start:  03/26/24    Expected End:  05/05/24       Pt will be 50% IND with HEP in 4 weeks in order to progress with therapy.          PT Goal 2 - Personal Goal       Start:  03/26/24    Expected End:  05/05/24       Pt will demonstrate equal cervical rotation without pain in 4 weeks to return to swimming and reduce cervicogenic involvement with dizziness.         PT Goal 3       Start:  03/26/24    Expected End:  05/05/24       Pt will improve 4 point DGI score to 12/12 in 4 weeks in order to reduce fall risk.          PT Goal 4       Start:  03/26/24    Expected End:  05/05/24       Pt will report a reduction in dizziness score to 2/10 in 4 weeks in order to improve reduce dizziness             PT Problem       PT Goal 1       Start:  03/26/24    Expected End:  06/04/24       Pt will be 100% IND with HEP in 8 weeks in order to maintain progress with therapy.           PT Goal 2       Start:  03/26/24    Expected End:  06/04/24       Pt will be able to perform community ambulation demonstrating normalized gait pattern with sway or path deviations in 8 weeks for recreational ambulation and a reduction in fall risk.          PT Goal 3       Start:  03/26/24    Expected End:  06/04/24       Pt will demonstrate subjective improvement of ADLs and  recreational activities through improved score of 0 on DHI in 8 weeks.

## 2024-05-20 ENCOUNTER — TREATMENT (OUTPATIENT)
Dept: PHYSICAL THERAPY | Facility: CLINIC | Age: 79
End: 2024-05-20
Payer: MEDICARE

## 2024-05-20 DIAGNOSIS — R42 DIZZINESS: ICD-10-CM

## 2024-05-20 DIAGNOSIS — M54.2 NECK PAIN: ICD-10-CM

## 2024-05-20 DIAGNOSIS — R26.81 UNSTEADINESS ON FEET: ICD-10-CM

## 2024-05-20 PROCEDURE — 97530 THERAPEUTIC ACTIVITIES: CPT | Mod: GP | Performed by: PHYSICAL THERAPIST

## 2024-05-20 PROCEDURE — 97112 NEUROMUSCULAR REEDUCATION: CPT | Mod: GP | Performed by: PHYSICAL THERAPIST

## 2024-05-20 ASSESSMENT — PAIN - FUNCTIONAL ASSESSMENT: PAIN_FUNCTIONAL_ASSESSMENT: 0-10

## 2024-05-20 ASSESSMENT — PAIN SCALES - GENERAL: PAINLEVEL_OUTOF10: 0 - NO PAIN

## 2024-05-20 NOTE — PROGRESS NOTES
Physical Therapy Progress Note/Treatment    Patient Name: Dominguez Ross  MRN: [id number]  Today's Date: 5/20/2024  Time Calculation  Start Time: 1101  Stop Time: 1140  Time Calculation (min): 39 min  PT Therapeutic Procedures Time Entry  Neuromuscular Re-Education Time Entry: 25  Therapeutic Activity Time Entry: 14    Insurance:  Visit number: 7 of 8  Authorization info: NO AUTH / [place]   Insurance Type: Payor: MEDICARE / Plan: MEDICARE PART A AND B / Product Type: *No Product type* /     Current Problem   1. Dizziness  Follow Up In Physical Therapy      2. Neck pain  Follow Up In Physical Therapy      3. Unsteadiness on feet  Follow Up In Physical Therapy          Subjective   General   General Comment: Pt feels that things may be a little better but hasn't gotten progressed as much as he would like. Pt would like to not feel dizzy when he cuts the grass or bending over and feeling like he's going to fall over. He notices that he continues to have deficits with ambulation. He reports compliance with HEP.  Precautions:  Precautions  Precautions Comment: Falls  Pain   Pain Assessment: 0-10  Pain Score: 0 - No pain (Dizziness)  Post Treatment Pain Level 0    Objective   Cervical:   Flexion - 45  Rotation - R 51; L 40  Extension - 22    Vestibular:   Smooth pursuit: WNL  Saccades: H WNL; V reduced teddy  VOR: H and V WNL     Balance:  Firm surface - romberg EO; EC slight sway no LOB  Semi-tandem: WNL EO  SLS - EO able to hold approximately 3 seconds    Modified DGI: 10/12 - continues to have difficulty with head motion and ambulation     Treatments:    Therapeutic activity:  Therapeutic Activity  Therapeutic Activity Performed: Yes  Therapeutic Activity 1: Re-assessment performed this date  Neuro Re-ed:   Balance/Neuromuscular Re-Education  Balance/Neuromuscular Re-Education Activity Performed: Yes  Balance/Neuromuscular Re-Education Activity 1: amb with head turns 4 lapsx2 in // bars  Balance/Neuromuscular  "Re-Education Activity 2: amb with head nods 4 lapsx2 in // bars  Balance/Neuromuscular Re-Education Activity 3: standing firm surface diagnoal head motion 10x2  Balance/Neuromuscular Re-Education Activity 4: firm surface EC with head nods and turns 30\"x3 no UE      Assessment   Assessment:   PT Assessment  Assessment Comment: Pt continues to have deficits with higher level balance/vestibular tasks noted this date. Discussed with pt and agreeable for PT extension to focus on higher level tasks for reduced fall risk.    Plan:    1x/week for 5 weeks    OP EDUCATION:   Updated HEP with ambulation head turns     Goals:   Active       PT Problem       PT Goal 1       Start:  03/26/24    Expected End:  05/05/24       Pt will be 50% IND with HEP in 4 weeks in order to progress with therapy.          PT Goal 2 - Personal Goal       Start:  03/26/24    Expected End:  05/05/24       Pt will demonstrate equal cervical rotation without pain in 4 weeks to return to swimming and reduce cervicogenic involvement with dizziness.         PT Goal 3       Start:  03/26/24    Expected End:  05/05/24       Pt will improve 4 point DGI score to 12/12 in 4 weeks in order to reduce fall risk.          PT Goal 4       Start:  03/26/24    Expected End:  05/05/24       Pt will report a reduction in dizziness score to 2/10 in 4 weeks in order to improve reduce dizziness             PT Problem       PT Goal 1       Start:  03/26/24    Expected End:  06/04/24       Pt will be 100% IND with HEP in 8 weeks in order to maintain progress with therapy.           PT Goal 2       Start:  03/26/24    Expected End:  06/04/24       Pt will be able to perform community ambulation demonstrating normalized gait pattern with sway or path deviations in 8 weeks for recreational ambulation and a reduction in fall risk.          PT Goal 3       Start:  03/26/24    Expected End:  06/04/24       Pt will demonstrate subjective improvement of ADLs and recreational " activities through improved score of 0 on DHI in 8 weeks.           Other Measures  Dizziness Handicap Inventory: 32  Dynamic Gait Index (DGI): 10/12

## 2024-05-21 ENCOUNTER — OFFICE VISIT (OUTPATIENT)
Dept: RHEUMATOLOGY | Facility: CLINIC | Age: 79
End: 2024-05-21
Payer: MEDICARE

## 2024-05-21 ENCOUNTER — LAB (OUTPATIENT)
Dept: LAB | Facility: LAB | Age: 79
End: 2024-05-21
Payer: MEDICARE

## 2024-05-21 VITALS
WEIGHT: 151 LBS | HEART RATE: 65 BPM | SYSTOLIC BLOOD PRESSURE: 115 MMHG | DIASTOLIC BLOOD PRESSURE: 71 MMHG | OXYGEN SATURATION: 98 % | BODY MASS INDEX: 21.06 KG/M2

## 2024-05-21 DIAGNOSIS — R42 DIZZINESS AND GIDDINESS: ICD-10-CM

## 2024-05-21 DIAGNOSIS — M35.3 POLYMYALGIA RHEUMATICA (MULTI): Primary | ICD-10-CM

## 2024-05-21 DIAGNOSIS — Z79.899 ENCOUNTER FOR LONG-TERM (CURRENT) USE OF MEDICATIONS: ICD-10-CM

## 2024-05-21 DIAGNOSIS — M35.3 POLYMYALGIA RHEUMATICA (MULTI): ICD-10-CM

## 2024-05-21 LAB
ALBUMIN SERPL BCP-MCNC: 4.3 G/DL (ref 3.4–5)
ALP SERPL-CCNC: 93 U/L (ref 33–136)
ALT SERPL W P-5'-P-CCNC: 17 U/L (ref 10–52)
ANION GAP SERPL CALC-SCNC: 12 MMOL/L (ref 10–20)
AST SERPL W P-5'-P-CCNC: 24 U/L (ref 9–39)
BILIRUB SERPL-MCNC: 0.8 MG/DL (ref 0–1.2)
BUN SERPL-MCNC: 19 MG/DL (ref 6–23)
CALCIUM SERPL-MCNC: 9.7 MG/DL (ref 8.6–10.3)
CHLORIDE SERPL-SCNC: 103 MMOL/L (ref 98–107)
CK SERPL-CCNC: 245 U/L (ref 0–325)
CO2 SERPL-SCNC: 30 MMOL/L (ref 21–32)
CREAT SERPL-MCNC: 0.97 MG/DL (ref 0.5–1.3)
CRP SERPL-MCNC: <0.1 MG/DL
EGFRCR SERPLBLD CKD-EPI 2021: 81 ML/MIN/1.73M*2
ERYTHROCYTE [DISTWIDTH] IN BLOOD BY AUTOMATED COUNT: 14.1 % (ref 11.5–14.5)
ERYTHROCYTE [SEDIMENTATION RATE] IN BLOOD BY WESTERGREN METHOD: 10 MM/H (ref 0–20)
GLUCOSE SERPL-MCNC: 93 MG/DL (ref 74–99)
HCT VFR BLD AUTO: 45 % (ref 41–52)
HGB BLD-MCNC: 14.1 G/DL (ref 13.5–17.5)
MCH RBC QN AUTO: 33.3 PG (ref 26–34)
MCHC RBC AUTO-ENTMCNC: 31.3 G/DL (ref 32–36)
MCV RBC AUTO: 106 FL (ref 80–100)
NRBC BLD-RTO: 0 /100 WBCS (ref 0–0)
PLATELET # BLD AUTO: 410 X10*3/UL (ref 150–450)
POTASSIUM SERPL-SCNC: 5.1 MMOL/L (ref 3.5–5.3)
PROT SERPL-MCNC: 7.1 G/DL (ref 6.4–8.2)
RBC # BLD AUTO: 4.23 X10*6/UL (ref 4.5–5.9)
SODIUM SERPL-SCNC: 140 MMOL/L (ref 136–145)
WBC # BLD AUTO: 8.8 X10*3/UL (ref 4.4–11.3)

## 2024-05-21 PROCEDURE — 36415 COLL VENOUS BLD VENIPUNCTURE: CPT

## 2024-05-21 PROCEDURE — 1159F MED LIST DOCD IN RCRD: CPT | Performed by: INTERNAL MEDICINE

## 2024-05-21 PROCEDURE — 82550 ASSAY OF CK (CPK): CPT

## 2024-05-21 PROCEDURE — 85027 COMPLETE CBC AUTOMATED: CPT

## 2024-05-21 PROCEDURE — 80053 COMPREHEN METABOLIC PANEL: CPT

## 2024-05-21 PROCEDURE — 1036F TOBACCO NON-USER: CPT | Performed by: INTERNAL MEDICINE

## 2024-05-21 PROCEDURE — 85652 RBC SED RATE AUTOMATED: CPT

## 2024-05-21 PROCEDURE — 86140 C-REACTIVE PROTEIN: CPT

## 2024-05-21 PROCEDURE — 99213 OFFICE O/P EST LOW 20 MIN: CPT | Performed by: INTERNAL MEDICINE

## 2024-05-21 RX ORDER — PREDNISONE 2.5 MG/1
1.25 TABLET ORAL DAILY
Qty: 45 TABLET | Refills: 3 | Status: SHIPPED | OUTPATIENT
Start: 2024-05-21 | End: 2025-05-21

## 2024-05-21 RX ORDER — METHOTREXATE 2.5 MG/1
10 TABLET ORAL
Qty: 52 TABLET | Refills: 3 | Status: SHIPPED | OUTPATIENT
Start: 2024-05-26 | End: 2025-05-26

## 2024-05-21 ASSESSMENT — ENCOUNTER SYMPTOMS
FATIGUE: 1
DIZZINESS: 1
NECK STIFFNESS: 1
NECK PAIN: 1

## 2024-05-21 NOTE — PROGRESS NOTES
Subjective   Patient ID: Dominguez Ross is a 78 y.o. male who presents for Follow-up.  HPI  Patient with history of polymyalgia rheumatica with elevated CPK and inflammatory arthritis.    At his last visit in November we had him continue on 10 mg of methotrexate.    His main complaint is dizziness.  He has been in physical therapy for vestibular therapy.  Still has some dizziness.    Denies any falls.  Has difficulty gaining weight.  Denies any infections.      Review of Systems   Constitutional:  Positive for fatigue.   Musculoskeletal:  Positive for neck pain and neck stiffness.   Neurological:  Positive for dizziness.       Objective   Physical Exam  GEN: NAD A&O x3  HEENT:no conjunctival redness, wearing glasses  NEURO: able to get out of chair without issues good strength in his upper and lower extremities  SKIN: no rashes  PULSES: +radials  MSK: no current swelling in the dip pip mcp wrist elbows shoulders knees ankles.      Assessment/Plan       Elevated CPK history of PMR symptoms with elevated ESR with history of inflammatory arthritis. Has tried steroid sparing agents but did not change his elevated cpk.     -Vertigo is his biggest complaint.  Currently in vestibular therapy.  Will get blood work and have him continue on his current regimen.

## 2024-06-05 ENCOUNTER — TREATMENT (OUTPATIENT)
Dept: PHYSICAL THERAPY | Facility: CLINIC | Age: 79
End: 2024-06-05
Payer: MEDICARE

## 2024-06-05 DIAGNOSIS — R26.81 UNSTEADINESS ON FEET: ICD-10-CM

## 2024-06-05 DIAGNOSIS — M54.2 NECK PAIN: ICD-10-CM

## 2024-06-05 DIAGNOSIS — R42 DIZZINESS: ICD-10-CM

## 2024-06-05 PROCEDURE — 97112 NEUROMUSCULAR REEDUCATION: CPT | Mod: GP | Performed by: PHYSICAL THERAPIST

## 2024-06-05 NOTE — PROGRESS NOTES
Physical Therapy Treatment    Patient Name: Dominguez Ross  MRN: [id number]  Today's Date: 6/5/2024  Time Calculation  Start Time: 1112  Stop Time: 1139  Time Calculation (min): 27 min  PT Therapeutic Procedures Time Entry  Neuromuscular Re-Education Time Entry: 27    Insurance:  Visit number: 8 of 12  Authorization info: NO AUTH / [place]   Insurance Type: Payor: MEDICARE / Plan: MEDICARE PART A AND B / Product Type: *No Product type* /     Current Problem   1. Dizziness  Follow Up In Physical Therapy      2. Neck pain  Follow Up In Physical Therapy      3. Unsteadiness on feet  Follow Up In Physical Therapy          Subjective   General   General Comment: Pt continues to report intermittent good/bad days with balance. He was able to do yard work without any problem.  Precautions:  Precautions  Precautions Comment: Falls  Pain    0  Post Treatment Pain Level 0    Objective   Path deviations during exercises this date however no LOB   Increased difficulty with EC     Treatments:    Neuro Re-ed:   Balance/Neuromuscular Re-Education  Balance/Neuromuscular Re-Education Activity Performed: Yes  Balance/Neuromuscular Re-Education Activity 1: UEB warm up  Balance/Neuromuscular Re-Education Activity 2: tandem walking 3 laps  Balance/Neuromuscular Re-Education Activity 3: tandem walk fwd/bwd 3 laps  Balance/Neuromuscular Re-Education Activity 4: VORx2 H BB with amb 3 laps  Balance/Neuromuscular Re-Education Activity 5: EC fwd/bwd steps 10x3 no UE      Assessment   Assessment:   PT Assessment  Assessment Comment: Pt largely challenged with dynamic balance/gait tasks with both EO and EC, increased path deviation without any significant LOB. WIll continue as tolerated updated HEP and educated importance of HEP.    Plan:    Continue balance/vestibular with ambulation     OP EDUCATION:   Bwd amb EO    Goals:   Active       PT Problem       PT Goal 1       Start:  03/26/24    Expected End:  05/05/24       Pt will be 50% IND with  HEP in 4 weeks in order to progress with therapy.          PT Goal 2 - Personal Goal       Start:  03/26/24    Expected End:  05/05/24       Pt will demonstrate equal cervical rotation without pain in 4 weeks to return to swimming and reduce cervicogenic involvement with dizziness.         PT Goal 3       Start:  03/26/24    Expected End:  05/05/24       Pt will improve 4 point DGI score to 12/12 in 4 weeks in order to reduce fall risk.          PT Goal 4       Start:  03/26/24    Expected End:  05/05/24       Pt will report a reduction in dizziness score to 2/10 in 4 weeks in order to improve reduce dizziness             PT Problem       PT Goal 1       Start:  03/26/24    Expected End:  06/04/24       Pt will be 100% IND with HEP in 8 weeks in order to maintain progress with therapy.           PT Goal 2       Start:  03/26/24    Expected End:  06/04/24       Pt will be able to perform community ambulation demonstrating normalized gait pattern with sway or path deviations in 8 weeks for recreational ambulation and a reduction in fall risk.          PT Goal 3       Start:  03/26/24    Expected End:  06/04/24       Pt will demonstrate subjective improvement of ADLs and recreational activities through improved score of 0 on DHI in 8 weeks.

## 2024-06-12 ENCOUNTER — TREATMENT (OUTPATIENT)
Dept: PHYSICAL THERAPY | Facility: CLINIC | Age: 79
End: 2024-06-12
Payer: MEDICARE

## 2024-06-12 DIAGNOSIS — R26.81 UNSTEADINESS ON FEET: ICD-10-CM

## 2024-06-12 DIAGNOSIS — M54.2 NECK PAIN: ICD-10-CM

## 2024-06-12 DIAGNOSIS — R42 DIZZINESS: ICD-10-CM

## 2024-06-12 PROCEDURE — 97112 NEUROMUSCULAR REEDUCATION: CPT | Mod: GP,CQ

## 2024-06-12 ASSESSMENT — PAIN - FUNCTIONAL ASSESSMENT: PAIN_FUNCTIONAL_ASSESSMENT: 0-10

## 2024-06-12 ASSESSMENT — PAIN SCALES - GENERAL: PAINLEVEL_OUTOF10: 0 - NO PAIN

## 2024-06-12 NOTE — PROGRESS NOTES
"Physical Therapy Treatment    Patient Name: Dominguez Ross  MRN: [id number]  Today's Date: 6/12/2024  Time Calculation  Start Time: 1025  Stop Time: 1105  Time Calculation (min): 40 min  PT Therapeutic Procedures Time Entry  Neuromuscular Re-Education Time Entry: 40    Insurance:  Visit number: 9 of 12  Authorization info: NO AUTH / [place]   Insurance Type: Payor: MEDICARE / Plan: MEDICARE PART A AND B / Product Type: *No Product type* /     Current Problem   1. Dizziness  Follow Up In Physical Therapy      2. Neck pain  Follow Up In Physical Therapy      3. Unsteadiness on feet  Follow Up In Physical Therapy          Subjective   General    Patient reports he feels like when he bends over and stands back upright he feels dizzy and unsteady. Reports a lot of leg fatigue when he does yard work and feels off balance a lot.     Precautions:   Fall risk    Pain   Pain Assessment: 0-10  Pain Score: 0 - No pain    Post Treatment Pain Level   0/10    Objective   Poor ankle/hip strategy     Treatments:    Neuro Re-ed:   Balance/Neuromuscular Re-Education  Balance/Neuromuscular Re-Education Activity Performed: Yes  Balance/Neuromuscular Re-Education Activity 1: UBE retro 5'  Balance/Neuromuscular Re-Education Activity 2: tandem stance holds 3 x 30\" r/l  Balance/Neuromuscular Re-Education Activity 3: tandem head turns r/l  Balance/Neuromuscular Re-Education Activity 4: rocker board ankle strategy 3 x 30\"  Balance/Neuromuscular Re-Education Activity 5: rocker board EC 3 x 30\"  Balance/Neuromuscular Re-Education Activity 6: rocker board head turns 3 x 30\"  Balance/Neuromuscular Re-Education Activity 7: squats on air ex 10x3 (no UE)  Balance/Neuromuscular Re-Education Activity 8: rebound lunges on air ex (no UE, alt LEs)  Balance/Neuromuscular Re-Education Activity 9: Review/update HEP      Assessment   Assessment:    Emphasis on ankle and hip strategies, peripheral balance tasks, BLE strengthening, and proprioception with " good tolerance. Challenged by all interventions with multiple instances of LOB with min/modA to remain upright. Continues to demonstrate balance deficits and will benefit from further skilled therapy to improve balance and reduce risk of fall.     Plan:    balance    OP EDUCATION:   Updated HEP- added squats and lunges @ counter    Goals:   Active       PT Problem       PT Goal 1       Start:  03/26/24    Expected End:  05/05/24       Pt will be 50% IND with HEP in 4 weeks in order to progress with therapy.          PT Goal 2 - Personal Goal       Start:  03/26/24    Expected End:  05/05/24       Pt will demonstrate equal cervical rotation without pain in 4 weeks to return to swimming and reduce cervicogenic involvement with dizziness.         PT Goal 3       Start:  03/26/24    Expected End:  05/05/24       Pt will improve 4 point DGI score to 12/12 in 4 weeks in order to reduce fall risk.          PT Goal 4       Start:  03/26/24    Expected End:  05/05/24       Pt will report a reduction in dizziness score to 2/10 in 4 weeks in order to improve reduce dizziness             PT Problem       PT Goal 1       Start:  03/26/24    Expected End:  06/04/24       Pt will be 100% IND with HEP in 8 weeks in order to maintain progress with therapy.           PT Goal 2       Start:  03/26/24    Expected End:  06/04/24       Pt will be able to perform community ambulation demonstrating normalized gait pattern with sway or path deviations in 8 weeks for recreational ambulation and a reduction in fall risk.          PT Goal 3       Start:  03/26/24    Expected End:  06/04/24       Pt will demonstrate subjective improvement of ADLs and recreational activities through improved score of 0 on DHI in 8 weeks.

## 2024-06-17 ENCOUNTER — TREATMENT (OUTPATIENT)
Dept: PHYSICAL THERAPY | Facility: CLINIC | Age: 79
End: 2024-06-17
Payer: MEDICARE

## 2024-06-17 DIAGNOSIS — R42 DIZZINESS: ICD-10-CM

## 2024-06-17 DIAGNOSIS — R26.81 UNSTEADINESS ON FEET: ICD-10-CM

## 2024-06-17 DIAGNOSIS — M54.2 NECK PAIN: ICD-10-CM

## 2024-06-17 PROCEDURE — 97112 NEUROMUSCULAR REEDUCATION: CPT | Mod: GP | Performed by: PHYSICAL THERAPIST

## 2024-06-17 ASSESSMENT — PAIN - FUNCTIONAL ASSESSMENT: PAIN_FUNCTIONAL_ASSESSMENT: 0-10

## 2024-06-17 ASSESSMENT — PAIN SCALES - GENERAL: PAINLEVEL_OUTOF10: 0 - NO PAIN

## 2024-06-17 NOTE — PROGRESS NOTES
"Physical Therapy Treatment    Patient Name: Dominguez Ross  MRN: [id number]  Today's Date: 6/17/2024  Time Calculation  Start Time: 1014  Stop Time: 1059  Time Calculation (min): 45 min  PT Therapeutic Procedures Time Entry  Neuromuscular Re-Education Time Entry: 45    Insurance:  Visit number: 10 of 12  Authorization info: NO AUTH / [place]   Insurance Type: Payor: MEDICARE / Plan: MEDICARE PART A AND B / Product Type: *No Product type* /     Current Problem   1. Dizziness  Follow Up In Physical Therapy      2. Neck pain  Follow Up In Physical Therapy      3. Unsteadiness on feet  Follow Up In Physical Therapy          Subjective   General   General Comment: Pt reports feeling pretty good today. Reports balance has been okay with no reported falls.  Precautions:  Precautions  Precautions Comment: Falls  Pain   Pain Assessment: 0-10  Pain Score: 0 - No pain  Post Treatment Pain Level 0    Objective   Increased sway and ankle strategy with smooth pursuit H/V and 2 target follow  Saccadic movement noted during 2 target tasks EC    Treatments:    Neuro Re-ed:   Balance/Neuromuscular Re-Education  Balance/Neuromuscular Re-Education Activity Performed: Yes  Balance/Neuromuscular Re-Education Activity 1: UBE fwd/bwd 3' each  Balance/Neuromuscular Re-Education Activity 2: standing foam pad VOR x1 with convergence 3x30\"  Balance/Neuromuscular Re-Education Activity 3: standing rocker board H smooth pursuit busy background 3x30\"  Balance/Neuromuscular Re-Education Activity 4: standing rocker board V smooth pursuit 1x30\"  Balance/Neuromuscular Re-Education Activity 5: tandem walking 3 laps  Balance/Neuromuscular Re-Education Activity 6: 2 target follow EC H 3x30\"  Balance/Neuromuscular Re-Education Activity 7: 2 target follow EC V 3x30\"      Assessment   Assessment:   PT Assessment  Assessment Comment: Pt tolerated session well challenged with dual tasking exercises icnluding 2 target EC exercise this date, noting saccadic " movement with target find and reports of difficulty. Will continue with overall vestibular and balance training.    Plan:    Continue to challenge system with 2 target follow    OP EDUCATION:   Educated on continuing to challenge the system    Goals:   Active       PT Problem       PT Goal 1       Start:  03/26/24    Expected End:  05/05/24       Pt will be 50% IND with HEP in 4 weeks in order to progress with therapy.          PT Goal 2 - Personal Goal       Start:  03/26/24    Expected End:  05/05/24       Pt will demonstrate equal cervical rotation without pain in 4 weeks to return to swimming and reduce cervicogenic involvement with dizziness.         PT Goal 3       Start:  03/26/24    Expected End:  05/05/24       Pt will improve 4 point DGI score to 12/12 in 4 weeks in order to reduce fall risk.          PT Goal 4       Start:  03/26/24    Expected End:  05/05/24       Pt will report a reduction in dizziness score to 2/10 in 4 weeks in order to improve reduce dizziness             PT Problem       PT Goal 1       Start:  03/26/24    Expected End:  06/04/24       Pt will be 100% IND with HEP in 8 weeks in order to maintain progress with therapy.           PT Goal 2       Start:  03/26/24    Expected End:  06/04/24       Pt will be able to perform community ambulation demonstrating normalized gait pattern with sway or path deviations in 8 weeks for recreational ambulation and a reduction in fall risk.          PT Goal 3       Start:  03/26/24    Expected End:  06/04/24       Pt will demonstrate subjective improvement of ADLs and recreational activities through improved score of 0 on DHI in 8 weeks.

## 2024-06-24 ENCOUNTER — TREATMENT (OUTPATIENT)
Dept: PHYSICAL THERAPY | Facility: CLINIC | Age: 79
End: 2024-06-24
Payer: MEDICARE

## 2024-06-24 DIAGNOSIS — R26.81 UNSTEADINESS ON FEET: ICD-10-CM

## 2024-06-24 DIAGNOSIS — R42 DIZZINESS: ICD-10-CM

## 2024-06-24 DIAGNOSIS — M54.2 NECK PAIN: ICD-10-CM

## 2024-06-24 PROCEDURE — 97112 NEUROMUSCULAR REEDUCATION: CPT | Mod: GP | Performed by: PHYSICAL THERAPIST

## 2024-06-24 ASSESSMENT — PAIN - FUNCTIONAL ASSESSMENT: PAIN_FUNCTIONAL_ASSESSMENT: 0-10

## 2024-06-24 ASSESSMENT — PAIN SCALES - GENERAL: PAINLEVEL_OUTOF10: 0 - NO PAIN

## 2024-06-24 NOTE — PROGRESS NOTES
"Physical Therapy Treatment    Patient Name: Dominguez Ross  MRN: [id number]  Today's Date: 6/24/2024  Time Calculation  Start Time: 1015  Stop Time: 1055  Time Calculation (min): 40 min  PT Therapeutic Procedures Time Entry  Neuromuscular Re-Education Time Entry: 40    Insurance:  Visit number: 11 of 12  Authorization info: NO AUTH / [place]   Insurance Type: Payor: MEDICARE / Plan: MEDICARE PART A AND B / Product Type: *No Product type* /     Current Problem   1. Dizziness  Follow Up In Physical Therapy      2. Neck pain  Follow Up In Physical Therapy      3. Unsteadiness on feet  Follow Up In Physical Therapy          Subjective   General   General Comment: Pt reports feeling about the same. No falls reported and dizziness has been about the same.  Precautions:  Precautions  Precautions Comment: Falls  Pain   Pain Assessment: 0-10  0-10 (Numeric) Pain Score: 0 - No pain  Post Treatment Pain Level 0    Objective   Increased posterior sway and ankle strategy during rocker board exercises  Increased calf tightness    Treatments:    Neuro Re-ed:   Balance/Neuromuscular Re-Education  Balance/Neuromuscular Re-Education Activity Performed: Yes  Balance/Neuromuscular Re-Education Activity 1: UBE fwd/bwd 3' each  Balance/Neuromuscular Re-Education Activity 2: 2 target follow EC H 3x30\"  Balance/Neuromuscular Re-Education Activity 3: 2 target follow EC V 3x30\"  Balance/Neuromuscular Re-Education Activity 4: standing rocker board smooth pursuit H busy background 2x30\"  Balance/Neuromuscular Re-Education Activity 5: standing rocker board VORx2 busy backrgound 2x30\"  Balance/Neuromuscular Re-Education Activity 6: standing rocker board VORx2 BB H 30\"x2  Balance/Neuromuscular Re-Education Activity 7: rocker board forward reaches 10x2  Balance/Neuromuscular Re-Education Activity 8: calf stretch on wedge 30\"x3      Assessment   Assessment:   PT Assessment  Assessment Comment: Pt tolerated session well without increased symptoms. " Pt was challenged with rocker board activities due to increased posterior sway noted during exercises. Pt has muscle tightness in the calves which can be contributing to poor ankle strategies creating displacement of balance. Will perform re-assessment next week.    Plan:    Re-assessment next visit    OP EDUCATION:   Educated on hip and ankle strategies    Goals:   Active       PT Problem       PT Goal 1       Start:  03/26/24    Expected End:  05/05/24       Pt will be 50% IND with HEP in 4 weeks in order to progress with therapy.          PT Goal 2 - Personal Goal       Start:  03/26/24    Expected End:  05/05/24       Pt will demonstrate equal cervical rotation without pain in 4 weeks to return to swimming and reduce cervicogenic involvement with dizziness.         PT Goal 3       Start:  03/26/24    Expected End:  05/05/24       Pt will improve 4 point DGI score to 12/12 in 4 weeks in order to reduce fall risk.          PT Goal 4       Start:  03/26/24    Expected End:  05/05/24       Pt will report a reduction in dizziness score to 2/10 in 4 weeks in order to improve reduce dizziness             PT Problem       PT Goal 1       Start:  03/26/24    Expected End:  06/04/24       Pt will be 100% IND with HEP in 8 weeks in order to maintain progress with therapy.           PT Goal 2       Start:  03/26/24    Expected End:  06/04/24       Pt will be able to perform community ambulation demonstrating normalized gait pattern with sway or path deviations in 8 weeks for recreational ambulation and a reduction in fall risk.          PT Goal 3       Start:  03/26/24    Expected End:  06/04/24       Pt will demonstrate subjective improvement of ADLs and recreational activities through improved score of 0 on DHI in 8 weeks.

## 2024-07-01 ENCOUNTER — TREATMENT (OUTPATIENT)
Dept: PHYSICAL THERAPY | Facility: CLINIC | Age: 79
End: 2024-07-01
Payer: MEDICARE

## 2024-07-01 DIAGNOSIS — R42 DIZZINESS: Primary | ICD-10-CM

## 2024-07-01 DIAGNOSIS — R26.81 UNSTEADINESS ON FEET: ICD-10-CM

## 2024-07-01 DIAGNOSIS — M54.2 NECK PAIN: ICD-10-CM

## 2024-07-01 PROCEDURE — 97530 THERAPEUTIC ACTIVITIES: CPT | Mod: GP | Performed by: PHYSICAL THERAPIST

## 2024-07-01 ASSESSMENT — PAIN - FUNCTIONAL ASSESSMENT: PAIN_FUNCTIONAL_ASSESSMENT: 0-10

## 2024-07-01 ASSESSMENT — PAIN SCALES - GENERAL: PAINLEVEL_OUTOF10: 0 - NO PAIN

## 2024-07-01 NOTE — PROGRESS NOTES
Physical Therapy Treatment    Patient Name: Dominguez Ross  MRN: [id number]  Today's Date: 7/1/2024  Time Calculation  Start Time: 1315  Stop Time: 1356  Time Calculation (min): 41 min  PT Therapeutic Procedures Time Entry  Therapeutic Activity Time Entry: 41    Insurance:  Visit number: 12 of 15  Authorization info: NO AUTH / [place]   Insurance Type: Payor: MEDICARE / Plan: MEDICARE PART A AND B / Product Type: *No Product type* /     Current Problem   1. Dizziness  Follow Up In Physical Therapy      2. Neck pain  Follow Up In Physical Therapy      3. Unsteadiness on feet  Follow Up In Physical Therapy          Subjective   General   General Comment: Pt reports dizziness has been about the same. Reports balance has been the same.  Precautions:  Precautions  Precautions Comment: Falls  Pain   Pain Assessment: 0-10  0-10 (Numeric) Pain Score: 0 - No pain  Post Treatment Pain Level 0    Objective   Vestibulocular: WNL  Firm surface:  Normal stance EO/EC WNL; EO head turns slight increase in sway: EC head turns posterior displacement ; EC head nods posterior displacement  Semi-tandem EO WNL; EC WNL; head turns LOB lateral   Tandem EO no LOB however increased sway     Foam Surface:   Romberg: EO WNL; EC slight sway; EO head turns and nods posterior displacement     Treatments:    Therapeutic activity:  Therapeutic Activity  Therapeutic Activity Performed: Yes  Therapeutic Activity 1: re-assessment performed this date  Therapeutic Activity 2: UBE fwd/bwd 3' each - warm up    Assessment   Assessment:   PT Assessment  Assessment Comment: Pt continues to have deficits with overall balance despite improvement of vestibular system initial deficits. Pt has awareness with posterior displacement of balance however continues to have difficulty with correction and prevention of LOB. Continues to have multifactor cause of imbalance, discussed with patient and agreed to request to continue however will reduce frequency to  biweekly.    Plan:    Bi weekly for 3 more sessions    OP EDUCATION:   Educated on time required to improve balance     Goals:   Active       PT Problem       PT Goal 1       Start:  03/26/24    Expected End:  05/05/24       Pt will be 50% IND with HEP in 4 weeks in order to progress with therapy.          PT Goal 2 - Personal Goal       Start:  03/26/24    Expected End:  05/05/24       Pt will demonstrate equal cervical rotation without pain in 4 weeks to return to swimming and reduce cervicogenic involvement with dizziness.         PT Goal 3       Start:  03/26/24    Expected End:  05/05/24       Pt will improve 4 point DGI score to 12/12 in 4 weeks in order to reduce fall risk.          PT Goal 4       Start:  03/26/24    Expected End:  05/05/24       Pt will report a reduction in dizziness score to 2/10 in 4 weeks in order to improve reduce dizziness             PT Problem       PT Goal 1       Start:  03/26/24    Expected End:  06/04/24       Pt will be 100% IND with HEP in 8 weeks in order to maintain progress with therapy.           PT Goal 2       Start:  03/26/24    Expected End:  06/04/24       Pt will be able to perform community ambulation demonstrating normalized gait pattern with sway or path deviations in 8 weeks for recreational ambulation and a reduction in fall risk.          PT Goal 3       Start:  03/26/24    Expected End:  06/04/24       Pt will demonstrate subjective improvement of ADLs and recreational activities through improved score of 0 on DHI in 8 weeks.

## 2024-07-19 DIAGNOSIS — N40.0 BENIGN PROSTATIC HYPERPLASIA WITHOUT LOWER URINARY TRACT SYMPTOMS: ICD-10-CM

## 2024-07-23 RX ORDER — TAMSULOSIN HYDROCHLORIDE 0.4 MG/1
0.4 CAPSULE ORAL DAILY
Qty: 90 CAPSULE | Refills: 1 | Status: SHIPPED | OUTPATIENT
Start: 2024-07-23

## 2024-07-25 ENCOUNTER — APPOINTMENT (OUTPATIENT)
Dept: PHYSICAL THERAPY | Facility: CLINIC | Age: 79
End: 2024-07-25
Payer: MEDICARE

## 2024-07-30 ENCOUNTER — APPOINTMENT (OUTPATIENT)
Dept: RHEUMATOLOGY | Facility: CLINIC | Age: 79
End: 2024-07-30
Payer: MEDICARE

## 2024-07-30 ENCOUNTER — OFFICE VISIT (OUTPATIENT)
Dept: RHEUMATOLOGY | Facility: CLINIC | Age: 79
End: 2024-07-30
Payer: MEDICARE

## 2024-07-30 ENCOUNTER — TELEPHONE (OUTPATIENT)
Dept: PRIMARY CARE | Facility: CLINIC | Age: 79
End: 2024-07-30

## 2024-07-30 VITALS
WEIGHT: 150 LBS | HEART RATE: 63 BPM | BODY MASS INDEX: 20.92 KG/M2 | OXYGEN SATURATION: 98 % | DIASTOLIC BLOOD PRESSURE: 66 MMHG | SYSTOLIC BLOOD PRESSURE: 111 MMHG

## 2024-07-30 DIAGNOSIS — M13.80 SERONEGATIVE INFLAMMATORY ARTHRITIS: ICD-10-CM

## 2024-07-30 DIAGNOSIS — Z79.899 ENCOUNTER FOR LONG-TERM (CURRENT) USE OF MEDICATIONS: Primary | ICD-10-CM

## 2024-07-30 DIAGNOSIS — M35.3 POLYMYALGIA RHEUMATICA (MULTI): ICD-10-CM

## 2024-07-30 PROBLEM — G62.9 PERIPHERAL NEUROPATHY: Status: RESOLVED | Noted: 2023-05-10 | Resolved: 2024-07-30

## 2024-07-30 PROBLEM — N40.1 BENIGN PROSTATIC HYPERPLASIA WITH LOWER URINARY TRACT SYMPTOMS: Status: ACTIVE | Noted: 2018-08-20

## 2024-07-30 PROBLEM — L82.1 OTHER SEBORRHEIC KERATOSIS: Status: ACTIVE | Noted: 2022-12-09

## 2024-07-30 PROBLEM — M79.10 MUSCLE PAIN: Status: ACTIVE | Noted: 2024-07-30

## 2024-07-30 PROBLEM — M54.2 CERVICAL PAIN: Status: RESOLVED | Noted: 2023-05-10 | Resolved: 2024-07-30

## 2024-07-30 PROBLEM — Z86.39 HISTORY OF HYPERCHOLESTEROLEMIA: Status: ACTIVE | Noted: 2024-07-30

## 2024-07-30 PROBLEM — I65.23 ATHEROSCLEROSIS OF BOTH CAROTID ARTERIES: Status: ACTIVE | Noted: 2023-02-18

## 2024-07-30 PROBLEM — B35.3 TINEA PEDIS: Status: ACTIVE | Noted: 2022-12-09

## 2024-07-30 PROBLEM — D22.5 MELANOCYTIC NEVI OF TRUNK: Status: ACTIVE | Noted: 2022-12-09

## 2024-07-30 PROBLEM — D18.01 HEMANGIOMA OF SKIN AND SUBCUTANEOUS TISSUE: Status: ACTIVE | Noted: 2022-12-09

## 2024-07-30 PROBLEM — B35.1 TINEA UNGUIUM: Status: ACTIVE | Noted: 2022-12-09

## 2024-07-30 PROBLEM — E83.19 IRON OVERLOAD: Status: ACTIVE | Noted: 2024-07-30

## 2024-07-30 PROBLEM — L91.8 OTHER HYPERTROPHIC DISORDERS OF THE SKIN: Status: ACTIVE | Noted: 2022-12-09

## 2024-07-30 PROBLEM — L81.4 OTHER MELANIN HYPERPIGMENTATION: Status: ACTIVE | Noted: 2022-12-09

## 2024-07-30 PROBLEM — H81.10 BENIGN PAROXYSMAL POSITIONAL VERTIGO: Status: ACTIVE | Noted: 2024-07-30

## 2024-07-30 PROBLEM — M25.50 ARTHRALGIA OF MULTIPLE JOINTS: Status: ACTIVE | Noted: 2024-07-30

## 2024-07-30 PROBLEM — I99.8 VASCULAR CALCIFICATION: Status: ACTIVE | Noted: 2024-07-30

## 2024-07-30 PROCEDURE — 99214 OFFICE O/P EST MOD 30 MIN: CPT | Performed by: INTERNAL MEDICINE

## 2024-07-30 PROCEDURE — 1125F AMNT PAIN NOTED PAIN PRSNT: CPT | Performed by: INTERNAL MEDICINE

## 2024-07-30 PROCEDURE — 1159F MED LIST DOCD IN RCRD: CPT | Performed by: INTERNAL MEDICINE

## 2024-07-30 RX ORDER — COLESEVELAM 180 1/1
625 TABLET ORAL
COMMUNITY

## 2024-07-30 RX ORDER — METHOTREXATE 2.5 MG/1
12.5 TABLET ORAL
Qty: 60 TABLET | Refills: 3 | Status: SHIPPED | OUTPATIENT
Start: 2024-08-04 | End: 2025-08-04

## 2024-07-30 RX ORDER — PREDNISOLONE 5 MG/1
5 TABLET ORAL DAILY
COMMUNITY

## 2024-07-30 RX ORDER — PREDNISONE 5 MG/1
5 TABLET ORAL DAILY
Qty: 90 TABLET | Refills: 3 | Status: SHIPPED | OUTPATIENT
Start: 2024-07-30 | End: 2025-07-30

## 2024-07-30 RX ORDER — ROSUVASTATIN CALCIUM 10 MG/1
10 TABLET, COATED ORAL DAILY
COMMUNITY

## 2024-07-30 ASSESSMENT — ENCOUNTER SYMPTOMS
NECK PAIN: 1
NECK STIFFNESS: 1
DIZZINESS: 1
FATIGUE: 1

## 2024-07-30 ASSESSMENT — PAIN SCALES - GENERAL: PAINLEVEL: 8

## 2024-07-30 NOTE — PROGRESS NOTES
Subjective   Patient ID: Dominguez Ross is a 79 y.o. male who presents for Problems with hands.  HPI  Patient with history of polymyalgia rheumatica with elevated CPK and inflammatory arthritis.    Patient was last seen in May and at that time vertigo was his biggest complaint.  He was in vestibular therapy.  He still has dizziness, less than what it was before.  He still does everything that he needs to do.  He has been having problems in his hands and they have been more swollen specially in the morning.  He has been swimming more for the last 2 months.  He has had increase in stiffness also in his hands.  Denies any other weakness or cramping in his muscles.  Has decreased in hand strength because of the swelling and pain.  He has been compliant with his medications.  Denies any infections.  Review of Systems   Constitutional:  Positive for fatigue.   Musculoskeletal:  Positive for neck pain and neck stiffness.   Neurological:  Positive for dizziness.       Objective   Physical Exam  GEN: NAD A&O x3  HEENT:no conjunctival redness, wearing glasses  NEURO: able to get out of chair without issues good strength in his upper and lower extremities  SKIN: no rashes  PULSES: +radials  MSK: no current swelling in the dip pip m some mild fullness in the PIPs and the wrists wrist elbows shoulders knees ankles.      Assessment/Plan       Elevated CPK history of PMR symptoms with elevated ESR with history of inflammatory arthritis. Has tried steroid sparing agents but did not change his elevated cpk.     -Has had increase in inflammatory arthritis symptoms and will increase his methotrexate again.  Also his prednisone dose was decreased from 2.5 to 1.25.  Will give him 5 mg tablets.  Will increase his methotrexate back to 12.5 mg once a week.  Will see if we need to increase any further in the future.  At 1 point was taking 20 mg    Will see him again in 3 to 4 months or as needed

## 2024-07-31 DIAGNOSIS — E03.9 ACQUIRED HYPOTHYROIDISM: ICD-10-CM

## 2024-07-31 RX ORDER — LEVOTHYROXINE SODIUM 88 UG/1
88 TABLET ORAL
Qty: 90 TABLET | Refills: 0 | Status: SHIPPED | OUTPATIENT
Start: 2024-07-31

## 2024-08-05 ENCOUNTER — TREATMENT (OUTPATIENT)
Dept: PHYSICAL THERAPY | Facility: CLINIC | Age: 79
End: 2024-08-05
Payer: MEDICARE

## 2024-08-05 DIAGNOSIS — R26.81 UNSTEADINESS ON FEET: ICD-10-CM

## 2024-08-05 DIAGNOSIS — R42 DIZZINESS: ICD-10-CM

## 2024-08-05 DIAGNOSIS — M54.2 NECK PAIN: ICD-10-CM

## 2024-08-05 PROCEDURE — 97530 THERAPEUTIC ACTIVITIES: CPT | Mod: GP | Performed by: PHYSICAL THERAPIST

## 2024-08-05 PROCEDURE — 97112 NEUROMUSCULAR REEDUCATION: CPT | Mod: GP | Performed by: PHYSICAL THERAPIST

## 2024-08-05 ASSESSMENT — PAIN SCALES - GENERAL: PAINLEVEL_OUTOF10: 0 - NO PAIN

## 2024-08-05 ASSESSMENT — PAIN - FUNCTIONAL ASSESSMENT: PAIN_FUNCTIONAL_ASSESSMENT: 0-10

## 2024-08-05 NOTE — PROGRESS NOTES
"Physical Therapy Treatment    Patient Name: Dominguez Ross  MRN: [id number]  Today's Date: 8/5/2024  Time Calculation  Start Time: 0928  Stop Time: 1008  Time Calculation (min): 40 min  PT Therapeutic Procedures Time Entry  Neuromuscular Re-Education Time Entry: 25  Therapeutic Activity Time Entry: 15    Insurance:  Visit number: 13 of 15  Authorization info:  NO AUTH / [place]   Insurance Type: Payor: MEDICARE / Plan: MEDICARE PART A AND B / Product Type: *No Product type* /     Current Problem   1. Dizziness  Follow Up In Physical Therapy      2. Neck pain  Follow Up In Physical Therapy      3. Unsteadiness on feet  Follow Up In Physical Therapy          Subjective   General   General Comment: Pt reports he was doing well until yesterday when he was out doing yard work and became dehydrated which created the dizziness to return. He has joined the [employer] and started swimming again for about 30 min at a time. He feels that his stamina is improving slowly.  Precautions:  Precautions  Precautions Comment: Falls  Pain   Pain Assessment: 0-10  0-10 (Numeric) Pain Score: 0 - No pain  Post Treatment Pain Level 0    Objective   Posterior displacement COG     Treatments:    Therapeutic activity:  Therapeutic Activity  Therapeutic Activity Performed: Yes  Therapeutic Activity 1: Educated on COG in relation to continued posterior displament with use of core and trunk/pelvic position  Therapeutic Activity 2: Educated on importance of drinking water  Neuro Re-ed:   Balance/Neuromuscular Re-Education  Balance/Neuromuscular Re-Education Activity Performed: Yes  Balance/Neuromuscular Re-Education Activity 1: UBE fwd/bwd 3' each  Balance/Neuromuscular Re-Education Activity 2: 2 target follow EC H 3x30\"  Balance/Neuromuscular Re-Education Activity 3: 2 target follow EC V 3x30\"  Balance/Neuromuscular Re-Education Activity 4: rocker board EC with head turns (difficulty maintianing balance)  Balance/Neuromuscular Re-Education Activity " 5: rocker board postural correction while maintianing neutral EO  Balance/Neuromuscular Re-Education Activity 6: rocker board postural correction neutral control EC      Assessment   Assessment:   PT Assessment  Assessment Comment: Pt tolerated session well focus on postural control/position to help COG and reduce fall risk. Will continue as tolerated, provided extensive education on importance of rehydration after yesterday intense yardwork in heat without hydration.    Plan:    Continue to focus on postural control in relation to balance/COG     OP EDUCATION:   Educated on water intake in relation to vestibular system     Goals:   Active       PT Problem       PT Goal 1       Start:  03/26/24    Expected End:  05/05/24       Pt will be 50% IND with HEP in 4 weeks in order to progress with therapy.          PT Goal 2 - Personal Goal       Start:  03/26/24    Expected End:  05/05/24       Pt will demonstrate equal cervical rotation without pain in 4 weeks to return to swimming and reduce cervicogenic involvement with dizziness.         PT Goal 3       Start:  03/26/24    Expected End:  05/05/24       Pt will improve 4 point DGI score to 12/12 in 4 weeks in order to reduce fall risk.          PT Goal 4       Start:  03/26/24    Expected End:  05/05/24       Pt will report a reduction in dizziness score to 2/10 in 4 weeks in order to improve reduce dizziness             PT Problem       PT Goal 1       Start:  03/26/24    Expected End:  06/04/24       Pt will be 100% IND with HEP in 8 weeks in order to maintain progress with therapy.           PT Goal 2       Start:  03/26/24    Expected End:  06/04/24       Pt will be able to perform community ambulation demonstrating normalized gait pattern with sway or path deviations in 8 weeks for recreational ambulation and a reduction in fall risk.          PT Goal 3       Start:  03/26/24    Expected End:  06/04/24       Pt will demonstrate subjective improvement of ADLs and  recreational activities through improved score of 0 on DHI in 8 weeks.

## 2024-08-19 ENCOUNTER — TREATMENT (OUTPATIENT)
Dept: PHYSICAL THERAPY | Facility: CLINIC | Age: 79
End: 2024-08-19
Payer: MEDICARE

## 2024-08-19 DIAGNOSIS — R26.81 UNSTEADINESS ON FEET: ICD-10-CM

## 2024-08-19 DIAGNOSIS — R42 DIZZINESS: ICD-10-CM

## 2024-08-19 DIAGNOSIS — M54.2 NECK PAIN: ICD-10-CM

## 2024-08-19 PROCEDURE — 97530 THERAPEUTIC ACTIVITIES: CPT | Mod: GP | Performed by: PHYSICAL THERAPIST

## 2024-08-19 PROCEDURE — 97112 NEUROMUSCULAR REEDUCATION: CPT | Mod: GP | Performed by: PHYSICAL THERAPIST

## 2024-08-19 ASSESSMENT — PAIN SCALES - GENERAL: PAINLEVEL_OUTOF10: 0 - NO PAIN

## 2024-08-19 ASSESSMENT — PAIN - FUNCTIONAL ASSESSMENT: PAIN_FUNCTIONAL_ASSESSMENT: 0-10

## 2024-08-19 NOTE — PROGRESS NOTES
"Physical Therapy Discharge Treatment    Patient Name: Dominguez Ross  MRN: [id number]  Today's Date: 8/19/2024  Time Calculation  Start Time: 0929  Stop Time: 1008  Time Calculation (min): 39 min  PT Therapeutic Procedures Time Entry  Neuromuscular Re-Education Time Entry: 24  Therapeutic Activity Time Entry: 15    Insurance:  Visit number: 14 of 15  Authorization info: NO AUTH / [place] VISITS   Insurance Type: Payor: MEDICARE / Plan: MEDICARE PART A AND B / Product Type: *No Product type* /     Current Problem   1. Dizziness  Follow Up In Physical Therapy      2. Neck pain  Follow Up In Physical Therapy      3. Unsteadiness on feet  Follow Up In Physical Therapy          Subjective   General   General Comment: Pt reports things are going well, states there are days when the dizziness may be worse but states the last two weeks have been well.  Precautions:  Precautions  Precautions Comment: Falls  Pain   Pain Assessment: 0-10  0-10 (Numeric) Pain Score: 0 - No pain  Post Treatment Pain Level 0    Objective   DHI - 16  DGI Modified 4 point - 12/12    Firm Surface:  Romberg -EO/EC WNL  Tandem - WNL - unable to hold great than 10 seconds  SL - able to achieve position IND unable to hold greater than 3 seconds    Foam Surface:  Romberg - EO and EC WNL no LOB     Posterior displacement continues to be displayed    Treatments:    Therapeutic activity:  Therapeutic Activity  Therapeutic Activity Performed: Yes  Therapeutic Activity 1: Re-assessment  Neuro Re-ed:   Balance/Neuromuscular Re-Education  Balance/Neuromuscular Re-Education Activity Performed: Yes  Balance/Neuromuscular Re-Education Activity 1: UBE fwd/bwd 3' each  Balance/Neuromuscular Re-Education Activity 2: Foam romberg EO head turns  Balance/Neuromuscular Re-Education Activity 3: Foam normal stance EC head tilts 10x3  Balance/Neuromuscular Re-Education Activity 4: rocker board neutral control EC 30\"x3  Balance/Neuromuscular Re-Education Activity 5: rocker board " netural control EO with head turns 10x3  Balance/Neuromuscular Re-Education Activity 6: tandem fwd with head turns 3 laps         Assessment   Assessment:   PT Assessment  Assessment Comment: Pt has made good progress since the start of therapy. Demonstrating good indepenence with HEP and large improvement since start of recreational swimming. At this time pt and PT in agreement to discharge at this time.    Plan:    Discharge    OP EDUCATION:   Continue with established HEP     Goals:   Active       PT Problem       PT Goal 1 (Met)       Start:  03/26/24    Expected End:  06/04/24    Resolved:  08/19/24    Pt will be 100% IND with HEP in 8 weeks in order to maintain progress with therapy.           PT Goal 2 (Adequate for Discharge)       Start:  03/26/24    Expected End:  06/04/24       Pt will be able to perform community ambulation demonstrating normalized gait pattern with sway or path deviations in 8 weeks for recreational ambulation and a reduction in fall risk.          PT Goal 3 (Adequate for Discharge)       Start:  03/26/24    Expected End:  06/04/24       Pt will demonstrate subjective improvement of ADLs and recreational activities through improved score of 0 on DHI in 8 weeks.            Resolved       PT Problem       PT Goal 1 (Met)       Start:  03/26/24    Expected End:  05/05/24    Resolved:  08/19/24    Pt will be 50% IND with HEP in 4 weeks in order to progress with therapy.          PT Goal 2 - Personal Goal (Met)       Start:  03/26/24    Expected End:  05/05/24    Resolved:  08/19/24    Pt will demonstrate equal cervical rotation without pain in 4 weeks to return to swimming and reduce cervicogenic involvement with dizziness.         PT Goal 3 (Met)       Start:  03/26/24    Expected End:  05/05/24    Resolved:  08/19/24    Pt will improve 4 point DGI score to 12/12 in 4 weeks in order to reduce fall risk.          PT Goal 4 (Met)       Start:  03/26/24    Expected End:  05/05/24    Resolved:   08/19/24    Pt will report a reduction in dizziness score to 2/10 in 4 weeks in order to improve reduce dizziness           Other Measures  Dizziness Handicap Inventory: 16  Dynamic Gait Index (DGI): 12/12

## 2024-09-04 ENCOUNTER — TELEPHONE (OUTPATIENT)
Dept: RHEUMATOLOGY | Facility: CLINIC | Age: 79
End: 2024-09-04
Payer: MEDICARE

## 2024-09-04 NOTE — TELEPHONE ENCOUNTER
He should hold his methotrexate until he is better.  I feel that it was appropriate for the prednisone.  I do not think that he would require Paxlovid

## 2024-09-04 NOTE — TELEPHONE ENCOUNTER
Spoke with patient. He is aware that provider feels his treatment is appropriate. Also advised him to hold his methotrexate, per provider instruction.

## 2024-09-24 DIAGNOSIS — E03.9 ACQUIRED HYPOTHYROIDISM: ICD-10-CM

## 2024-09-25 RX ORDER — LEVOTHYROXINE SODIUM 88 UG/1
88 TABLET ORAL
Qty: 90 TABLET | Refills: 0 | Status: SHIPPED | OUTPATIENT
Start: 2024-09-25

## 2024-11-05 ENCOUNTER — OFFICE VISIT (OUTPATIENT)
Dept: URGENT CARE | Age: 79
End: 2024-11-05
Payer: MEDICARE

## 2024-11-05 VITALS
TEMPERATURE: 98.4 F | HEART RATE: 55 BPM | SYSTOLIC BLOOD PRESSURE: 117 MMHG | DIASTOLIC BLOOD PRESSURE: 75 MMHG | HEIGHT: 71 IN | WEIGHT: 152 LBS | BODY MASS INDEX: 21.28 KG/M2 | OXYGEN SATURATION: 98 %

## 2024-11-05 DIAGNOSIS — R05.1 ACUTE COUGH: ICD-10-CM

## 2024-11-05 DIAGNOSIS — J01.90 ACUTE SINUSITIS, RECURRENCE NOT SPECIFIED, UNSPECIFIED LOCATION: Primary | ICD-10-CM

## 2024-11-05 PROCEDURE — 1160F RVW MEDS BY RX/DR IN RCRD: CPT | Performed by: PHYSICIAN ASSISTANT

## 2024-11-05 PROCEDURE — 1036F TOBACCO NON-USER: CPT | Performed by: PHYSICIAN ASSISTANT

## 2024-11-05 PROCEDURE — 1125F AMNT PAIN NOTED PAIN PRSNT: CPT | Performed by: PHYSICIAN ASSISTANT

## 2024-11-05 PROCEDURE — 99213 OFFICE O/P EST LOW 20 MIN: CPT | Performed by: PHYSICIAN ASSISTANT

## 2024-11-05 PROCEDURE — 1159F MED LIST DOCD IN RCRD: CPT | Performed by: PHYSICIAN ASSISTANT

## 2024-11-05 RX ORDER — BENZONATATE 200 MG/1
200 CAPSULE ORAL 3 TIMES DAILY PRN
Qty: 30 CAPSULE | Refills: 0 | Status: SHIPPED | OUTPATIENT
Start: 2024-11-05

## 2024-11-05 RX ORDER — AZITHROMYCIN 500 MG/1
500 TABLET, FILM COATED ORAL DAILY
Qty: 5 TABLET | Refills: 0 | Status: SHIPPED | OUTPATIENT
Start: 2024-11-05 | End: 2024-11-10

## 2024-11-05 ASSESSMENT — PAIN SCALES - GENERAL: PAINLEVEL_OUTOF10: 3

## 2024-11-05 ASSESSMENT — ENCOUNTER SYMPTOMS: COUGH: 1

## 2024-11-05 NOTE — PROGRESS NOTES
Subjective   Patient ID: Dominguez Ross is a 79 y.o. male. They present today with a chief complaint of Cough (Patient c/o cough for over a month.).    History of Present Illness  Productive cough (worse am), nasal congestion/rhinorrhea/post nasal drip x one month.    Hx of covid two months ago.    Denies fever, chills, sweats, sore throat, ear pain, chest pain, SOB.    Declines CXR      Cough  Associated symptoms include rhinorrhea. Pertinent negatives include no chest pain, chills, ear pain, fever, sore throat or shortness of breath.       Past Medical History  Allergies as of 11/05/2024 - Reviewed 11/05/2024   Allergen Reaction Noted    Statins-hmg-coa reductase inhibitors Other 05/10/2023       (Not in a hospital admission)       Past Medical History:   Diagnosis Date    Abnormal finding of blood chemistry, unspecified 12/30/2013    Abnormal blood chemistry    Abnormal levels of other serum enzymes 03/10/2020    Elevated CPK    Abrasion of left upper arm, initial encounter 09/04/2019    Arm abrasion, left, initial encounter    Acquired trigger finger 05/10/2023    Acute upper respiratory infection, unspecified 12/29/2017    Acute upper respiratory infection    Autoimmune thyroiditis     Hashimoto's thyroiditis    Bilateral shoulder pain 05/10/2023    Colon adenomas 05/10/2023    Elevated CPK 08/17/2022    Encounter for general adult medical examination without abnormal findings 11/17/2017    Annual physical exam    Hemorrhage of anus and rectum 04/03/2019    Bright red rectal bleeding    Myalgia, unspecified site     Muscular aches    Neck pain on left side 05/10/2023    Nuclear senile cataract 07/24/2018    Formatting of this note might be different from the original. Added automatically from request for surgery [id number] Formatting of this note might be different from the original. Added automatically from request for surgery [id number]    Occlusion and stenosis of bilateral carotid arteries 02/18/2023     Formatting of this note might be different from the original. Mild Test done at  2/2023    Other conditions influencing health status     Inflammatory Myopathy (Myositis)    Other conditions influencing health status     Nephrolithiasis Of The Right Kidney    Other conditions influencing health status     Induced CPK Elevation    Other conditions influencing health status 01/20/2016    History of cough    Other conditions influencing health status 11/17/2017    Chronic steroid use    Other disorders of iron metabolism     Iron overload    Pain in unspecified joint     Arthralgia of multiple sites    Personal history of other (healed) physical injury and trauma 11/03/2017    History of insect bite    Personal history of other diseases of male genital organs 10/02/2012    History of benign prostatic hypertrophy    Personal history of other diseases of the digestive system     History of hemorrhoids    Personal history of other diseases of the musculoskeletal system and connective tissue     History of polymyalgia rheumatica    Personal history of other diseases of the nervous system and sense organs     History of carpal tunnel syndrome    Personal history of other diseases of the nervous system and sense organs     History of peripheral neuropathy    Personal history of other diseases of the respiratory system 05/01/2018    History of acute sinusitis    Personal history of other diseases of the respiratory system 11/12/2015    History of acute pharyngitis    Personal history of other diseases of the respiratory system 03/09/2016    History of acute bronchitis    Personal history of other drug therapy 11/15/2016    History of influenza vaccination    Personal history of other endocrine, nutritional and metabolic disease     History of hypercholesterolemia    Personal history of other endocrine, nutritional and metabolic disease 11/17/2017    History of vitamin D deficiency    Personal history of other specified  "conditions     History of abdominal pain    Personal history of other specified conditions 02/12/2013    History of chest pain    Polyneuropathy, unspecified     Polyneuropathy    Skin change 05/10/2023    Sprain of joints and ligaments of other parts of neck, initial encounter     Sprain of joints and ligaments of other parts of neck    Supraventricular tachycardia (CMS-HCC) 12/16/2019       Past Surgical History:   Procedure Laterality Date    BACK SURGERY  08/02/2012    Lower Back Surgery    COLONOSCOPY  10/29/2012    Complete Colonoscopy    MR HEAD ANGIO WO IV CONTRAST  5/24/2023    MR HEAD ANGIO WO IV CONTRAST [facility]    OTHER SURGICAL HISTORY  08/02/2012    Biopsy Muscle        reports that he quit smoking about 56 years ago. His smoking use included cigarettes. He has never used smokeless tobacco. He reports current alcohol use. He reports that he does not use drugs.    Review of Systems  Review of Systems   Constitutional:  Negative for chills, diaphoresis and fever.   HENT:  Positive for congestion and rhinorrhea. Negative for ear pain and sore throat.    Respiratory:  Positive for cough. Negative for shortness of breath.    Cardiovascular:  Negative for chest pain.   All other systems reviewed and are negative.                                 Objective    Vitals:    11/05/24 1504   BP: 117/75   Pulse: 55   Temp: 36.9 °C (98.4 °F)   SpO2: 98%   Weight: 68.9 kg (152 lb)   Height: 1.803 m (5' 11\")     No LMP for male patient.    Physical Exam  Vitals and nursing note reviewed.   Constitutional:       General: He is not in acute distress.  HENT:      Nose: Congestion and rhinorrhea present.   Cardiovascular:      Rate and Rhythm: Normal rate and regular rhythm.      Heart sounds: Normal heart sounds.   Pulmonary:      Effort: Pulmonary effort is normal.      Breath sounds: Normal breath sounds.   Neurological:      Mental Status: He is alert.         Procedures    Point of Care Test & Imaging " Results from this visit  No results found for this visit on 11/05/24.   No results found.    Diagnostic study results (if any) were reviewed by Zuri Munroe PA-C.    Assessment/Plan   Allergies, medications, history, and pertinent labs/EKGs/Imaging reviewed by Zuri Munroe PA-C.         Orders and Diagnoses  There are no diagnoses linked to this encounter.    Medical Admin Record      Patient disposition: Home    Electronically signed by Zuri Munroe PA-C  4:29 PM

## 2024-11-07 ASSESSMENT — ENCOUNTER SYMPTOMS
SHORTNESS OF BREATH: 0
SORE THROAT: 0
FEVER: 0
RHINORRHEA: 1
CHILLS: 0
DIAPHORESIS: 0

## 2024-11-11 ENCOUNTER — HOSPITAL ENCOUNTER (OUTPATIENT)
Dept: RADIOLOGY | Facility: HOSPITAL | Age: 79
Discharge: HOME | End: 2024-11-11
Payer: MEDICARE

## 2024-11-11 ENCOUNTER — APPOINTMENT (OUTPATIENT)
Dept: ORTHOPEDIC SURGERY | Facility: CLINIC | Age: 79
End: 2024-11-11
Payer: MEDICARE

## 2024-11-11 DIAGNOSIS — M25.531 BILATERAL WRIST PAIN: ICD-10-CM

## 2024-11-11 DIAGNOSIS — M79.642 BILATERAL HAND PAIN: ICD-10-CM

## 2024-11-11 DIAGNOSIS — G56.03 BILATERAL CARPAL TUNNEL SYNDROME: Primary | ICD-10-CM

## 2024-11-11 DIAGNOSIS — M25.532 BILATERAL WRIST PAIN: ICD-10-CM

## 2024-11-11 DIAGNOSIS — M79.641 BILATERAL HAND PAIN: ICD-10-CM

## 2024-11-11 PROCEDURE — 73110 X-RAY EXAM OF WRIST: CPT | Mod: 50

## 2024-11-11 PROCEDURE — 1160F RVW MEDS BY RX/DR IN RCRD: CPT | Performed by: ORTHOPAEDIC SURGERY

## 2024-11-11 PROCEDURE — 73130 X-RAY EXAM OF HAND: CPT | Mod: 50

## 2024-11-11 PROCEDURE — 1036F TOBACCO NON-USER: CPT | Performed by: ORTHOPAEDIC SURGERY

## 2024-11-11 PROCEDURE — L3908 WHO COCK-UP NONMOLDE PRE OTS: HCPCS | Performed by: ORTHOPAEDIC SURGERY

## 2024-11-11 PROCEDURE — 73130 X-RAY EXAM OF HAND: CPT | Mod: BILATERAL PROCEDURE | Performed by: RADIOLOGY

## 2024-11-11 PROCEDURE — 99213 OFFICE O/P EST LOW 20 MIN: CPT | Performed by: ORTHOPAEDIC SURGERY

## 2024-11-11 PROCEDURE — 73110 X-RAY EXAM OF WRIST: CPT | Mod: BILATERAL PROCEDURE | Performed by: RADIOLOGY

## 2024-11-11 PROCEDURE — 1159F MED LIST DOCD IN RCRD: CPT | Performed by: ORTHOPAEDIC SURGERY

## 2024-11-11 ASSESSMENT — PAIN - FUNCTIONAL ASSESSMENT: PAIN_FUNCTIONAL_ASSESSMENT: NO/DENIES PAIN

## 2024-11-12 NOTE — PROGRESS NOTES
History of Present Illness:  Chief Complaint   Patient presents with    Left Hand - Numbness, Pain    Right Hand - Numbness, Pain       The patient presents today for evaluation of bilateral hand pain.  The patient endorses numbness and tingling (predominantly radial three digits).  He has not tried any formal treatments for the numbness and tingling he has been getting.  The patient notes the pain is worse with elevated hand activities and at night.  He has also noticed locking and catching of bilateral middle and ring fingers with associated sharp pain.  The patient denies any recent trauma.  Symptoms worsening over the last 4 to 6 months.  He was seen in 2022 for left middle finger trigger finger and underwent corticosteroid injection at that time.      Past Medical History:   Diagnosis Date    Abnormal finding of blood chemistry, unspecified 12/30/2013    Abnormal blood chemistry    Abnormal levels of other serum enzymes 03/10/2020    Elevated CPK    Abrasion of left upper arm, initial encounter 09/04/2019    Arm abrasion, left, initial encounter    Acquired trigger finger 05/10/2023    Acute upper respiratory infection, unspecified 12/29/2017    Acute upper respiratory infection    Autoimmune thyroiditis     Hashimoto's thyroiditis    Bilateral shoulder pain 05/10/2023    Colon adenomas 05/10/2023    Elevated CPK 08/17/2022    Encounter for general adult medical examination without abnormal findings 11/17/2017    Annual physical exam    Hemorrhage of anus and rectum 04/03/2019    Bright red rectal bleeding    Myalgia, unspecified site     Muscular aches    Neck pain on left side 05/10/2023    Nuclear senile cataract 07/24/2018    Formatting of this note might be different from the original. Added automatically from request for surgery [id number] Formatting of this note might be different from the original. Added automatically from request for surgery [id number]    Occlusion and stenosis of bilateral carotid arteries  02/18/2023    Formatting of this note might be different from the original. Mild Test done at  2/2023    Other conditions influencing health status     Inflammatory Myopathy (Myositis)    Other conditions influencing health status     Nephrolithiasis Of The Right Kidney    Other conditions influencing health status     Induced CPK Elevation    Other conditions influencing health status 01/20/2016    History of cough    Other conditions influencing health status 11/17/2017    Chronic steroid use    Other disorders of iron metabolism     Iron overload    Pain in unspecified joint     Arthralgia of multiple sites    Personal history of other (healed) physical injury and trauma 11/03/2017    History of insect bite    Personal history of other diseases of male genital organs 10/02/2012    History of benign prostatic hypertrophy    Personal history of other diseases of the digestive system     History of hemorrhoids    Personal history of other diseases of the musculoskeletal system and connective tissue     History of polymyalgia rheumatica    Personal history of other diseases of the nervous system and sense organs     History of carpal tunnel syndrome    Personal history of other diseases of the nervous system and sense organs     History of peripheral neuropathy    Personal history of other diseases of the respiratory system 05/01/2018    History of acute sinusitis    Personal history of other diseases of the respiratory system 11/12/2015    History of acute pharyngitis    Personal history of other diseases of the respiratory system 03/09/2016    History of acute bronchitis    Personal history of other drug therapy 11/15/2016    History of influenza vaccination    Personal history of other endocrine, nutritional and metabolic disease     History of hypercholesterolemia    Personal history of other endocrine, nutritional and metabolic disease 11/17/2017    History of vitamin D deficiency    Personal history of other  specified conditions     History of abdominal pain    Personal history of other specified conditions 2013    History of chest pain    Polyneuropathy, unspecified     Polyneuropathy    Skin change 05/10/2023    Sprain of joints and ligaments of other parts of neck, initial encounter     Sprain of joints and ligaments of other parts of neck    Supraventricular tachycardia (CMS-HCC) 2019       Medication Documentation Review Audit       Reviewed by Verito Villalta MA (Medical Assistant) on 24 at 1055      Medication Order Taking? Sig Documenting Provider Last Dose Status   aspirin 81 mg EC tablet [id number] No Take 1 tablet (81 mg) by mouth once daily. Historical Provider, MD Taking Active   azithromycin (Zithromax) 500 mg tablet [id number]  Take 1 tablet (500 mg) by mouth once daily for 5 days. Zuri Munroe PA-C   11/10/24 2359   bempedoic acid 180 mg tablet [id number] No Take 180 mg by mouth once daily. Historical Provider, MD Not Taking Active   benzonatate (Tessalon) 200 mg capsule [id number]  Take 1 capsule (200 mg) by mouth 3 times a day as needed for cough for up to 30 doses. Do not crush or chew. Zuri Munroe PA-C  Active   colesevelam (Welchol) 625 mg tablet [id number] No Take 1 tablet (625 mg) by mouth 2 times daily (morning and late afternoon). Historical Provider, MD Taking Active   folic acid (Folvite) 1 mg tablet [id number] No Take 1 tablet (1 mg) by mouth once daily. Historical Provider, MD Taking Active   levothyroxine (Synthroid, Levoxyl) 88 mcg tablet [id number]  Take 1 tablet (88 mcg) by mouth once daily in the morning. Take before meals. Rosalee Vanessa, APRN-CNP  Active   methotrexate (Trexall) 2.5 mg tablet [id number]  Take 5 tablets (12.5 mg total) by mouth 1 (one) time per week. Massiel Caballero MD  Active   metoprolol succinate XL (Toprol-XL) 50 mg 24 hr tablet [id number] No Take 1 tablet (50 mg) by mouth once daily. Historical Provider, MD Taking Active   prednisoLONE (Sterane) 5 mg  tablet tablet [id number] No Take 1 tablet (5 mg) by mouth once daily. Historical Provider, MD Not Taking Active   predniSONE (Deltasone) 5 mg tablet [id number]  Take 1 tablet (5 mg) by mouth once daily. Massiel Caballero MD  Active   rosuvastatin (Crestor) 10 mg tablet [id number] No Take 1 tablet (10 mg) by mouth once daily. Historical Provider, MD Not Taking Active   tamsulosin (Flomax) 0.4 mg 24 hr capsule [id number] No TAKE 1 CAPSULE(0.4 MG) BY MOUTH EVERY DAY Nova Becerra, APRN-CNP Taking Active                    Allergies   Allergen Reactions    Statins-Hmg-Coa Reductase Inhibitors Other       Social History     Socioeconomic History    Marital status:      Spouse name: Not on file    Number of children: Not on file    Years of education: Not on file    Highest education level: Not on file   Occupational History    Not on file   Tobacco Use    Smoking status: Former     Current packs/day: 0.00     Types: Cigarettes     Quit date:      Years since quittin.9    Smokeless tobacco: Never   Vaping Use    Vaping status: Never Used   Substance and Sexual Activity    Alcohol use: Yes     Comment: social    Drug use: Never    Sexual activity: Not on file   Other Topics Concern    Not on file   Social History Narrative    Not on file     Social Drivers of Health     Financial Resource Strain: Not on file   Food Insecurity: Not on file   Transportation Needs: Not on file   Physical Activity: Not on file   Stress: Not on file   Social Connections: Not on file   Intimate Partner Violence: Not on file   Housing Stability: Not on file       Past Surgical History:   Procedure Laterality Date    BACK SURGERY  2012    Lower Back Surgery    COLONOSCOPY  10/29/2012    Complete Colonoscopy    MR HEAD ANGIO WO IV CONTRAST  2023    MR HEAD ANGIO WO IV CONTRAST [facility]    OTHER SURGICAL HISTORY  2012    Biopsy Muscle          Review of Systems   GENERAL: Negative for malaise,  significant weight loss, fever  MUSCULOSKELETAL: see HPI  NEURO:  see HPI     Physical Examination:  Constitutional: Appears well-developed and well-nourished.  Head: Normocephalic and atraumatic.  Eyes: EOMI grossly  Cardiovascular: Intact distal pulses.   Respiratory: Effort normal. No respiratory distress.  Neurologic: Alert and oriented to person, place, and time.  Skin: Skin is warm and dry.  Hematologic / Lymphatic: No lymphedema, lymphangitis.  Psychiatric: normal mood and affect. Behavior is normal.   Musculoskeletal:  bilateral wrist/hand:  No obvious swelling or masses  No appreciable thenar atrophy  No atrophy noted of hypothenar eminence   Positive Tinel's at carpal tunnel  + Median nerve compression test  + Soham's test  Sensation grossly intact to light touch distally  5/5 thumb Abduction, 5/5 Finger Abduction  Tenderness about middle and ring finger A1 pulleys with palpable crepitus.  Radial pulse palpable  Good capillary refill     Bilateral hand and wrist radiographs ordered and available for my review/interpretation: No fracture/dislocation.  Degenerative changes noted about multiple IP joints.  Mild chondrocalcinosis about radiocarpal joint.    Assessment:  Patient with bilateral carpal tunnel syndrome     Plan:  We reviewed the disease process with the patient.  We discussed the role for electrodiagnostic testing and treatment decision making, immobilization, and injections.  We discussed surgical intervention reviewing the risks (neurologic injury, wound issues including infection, pillar pain, and recurrence) and benefits.  The patient elected for: bracing.  He will begin with overnight bracing and if he continues to have symptoms he will call for further follow-up.  We did discuss trying to avoid further progression of his nerve symptoms as this could potentially lead to permanent nerve dysfunction.  Questions addressed.

## 2024-11-26 ENCOUNTER — APPOINTMENT (OUTPATIENT)
Dept: RHEUMATOLOGY | Facility: CLINIC | Age: 79
End: 2024-11-26
Payer: MEDICARE

## 2024-11-26 ENCOUNTER — LAB (OUTPATIENT)
Dept: LAB | Facility: LAB | Age: 79
End: 2024-11-26
Payer: MEDICARE

## 2024-11-26 VITALS
BODY MASS INDEX: 22.12 KG/M2 | HEIGHT: 71 IN | OXYGEN SATURATION: 99 % | HEART RATE: 60 BPM | SYSTOLIC BLOOD PRESSURE: 129 MMHG | DIASTOLIC BLOOD PRESSURE: 68 MMHG | WEIGHT: 158 LBS

## 2024-11-26 DIAGNOSIS — M13.80 SERONEGATIVE INFLAMMATORY ARTHRITIS: Primary | ICD-10-CM

## 2024-11-26 DIAGNOSIS — Z79.899 ENCOUNTER FOR LONG-TERM (CURRENT) USE OF MEDICATIONS: ICD-10-CM

## 2024-11-26 DIAGNOSIS — M35.3 POLYMYALGIA RHEUMATICA (MULTI): ICD-10-CM

## 2024-11-26 DIAGNOSIS — G56.03 BILATERAL CARPAL TUNNEL SYNDROME: ICD-10-CM

## 2024-11-26 LAB
ALBUMIN SERPL BCP-MCNC: 4.2 G/DL (ref 3.4–5)
ALP SERPL-CCNC: 75 U/L (ref 33–136)
ALT SERPL W P-5'-P-CCNC: 17 U/L (ref 10–52)
ANION GAP SERPL CALC-SCNC: 11 MMOL/L (ref 10–20)
AST SERPL W P-5'-P-CCNC: 27 U/L (ref 9–39)
BILIRUB SERPL-MCNC: 1 MG/DL (ref 0–1.2)
BUN SERPL-MCNC: 21 MG/DL (ref 6–23)
CALCIUM SERPL-MCNC: 9.1 MG/DL (ref 8.6–10.3)
CHLORIDE SERPL-SCNC: 104 MMOL/L (ref 98–107)
CO2 SERPL-SCNC: 29 MMOL/L (ref 21–32)
CREAT SERPL-MCNC: 0.92 MG/DL (ref 0.5–1.3)
CRP SERPL-MCNC: <0.1 MG/DL
EGFRCR SERPLBLD CKD-EPI 2021: 86 ML/MIN/1.73M*2
ERYTHROCYTE [DISTWIDTH] IN BLOOD BY AUTOMATED COUNT: 13.5 % (ref 11.5–14.5)
ERYTHROCYTE [SEDIMENTATION RATE] IN BLOOD BY WESTERGREN METHOD: 8 MM/H (ref 0–20)
GLUCOSE SERPL-MCNC: 84 MG/DL (ref 74–99)
HCT VFR BLD AUTO: 43.1 % (ref 41–52)
HGB BLD-MCNC: 13.6 G/DL (ref 13.5–17.5)
MCH RBC QN AUTO: 33.2 PG (ref 26–34)
MCHC RBC AUTO-ENTMCNC: 31.6 G/DL (ref 32–36)
MCV RBC AUTO: 105 FL (ref 80–100)
NRBC BLD-RTO: 0 /100 WBCS (ref 0–0)
PLATELET # BLD AUTO: 304 X10*3/UL (ref 150–450)
POTASSIUM SERPL-SCNC: 4.8 MMOL/L (ref 3.5–5.3)
PROT SERPL-MCNC: 6.6 G/DL (ref 6.4–8.2)
RBC # BLD AUTO: 4.1 X10*6/UL (ref 4.5–5.9)
SODIUM SERPL-SCNC: 139 MMOL/L (ref 136–145)
WBC # BLD AUTO: 7.2 X10*3/UL (ref 4.4–11.3)

## 2024-11-26 PROCEDURE — 85652 RBC SED RATE AUTOMATED: CPT

## 2024-11-26 PROCEDURE — 99213 OFFICE O/P EST LOW 20 MIN: CPT | Performed by: INTERNAL MEDICINE

## 2024-11-26 PROCEDURE — 85027 COMPLETE CBC AUTOMATED: CPT

## 2024-11-26 PROCEDURE — 36415 COLL VENOUS BLD VENIPUNCTURE: CPT

## 2024-11-26 PROCEDURE — 86140 C-REACTIVE PROTEIN: CPT

## 2024-11-26 PROCEDURE — G2211 COMPLEX E/M VISIT ADD ON: HCPCS | Performed by: INTERNAL MEDICINE

## 2024-11-26 PROCEDURE — 80053 COMPREHEN METABOLIC PANEL: CPT

## 2024-11-26 PROCEDURE — 1159F MED LIST DOCD IN RCRD: CPT | Performed by: INTERNAL MEDICINE

## 2024-11-26 RX ORDER — PREDNISONE 5 MG/1
2.5 TABLET ORAL DAILY
Qty: 45 TABLET | Refills: 3 | Status: SHIPPED | OUTPATIENT
Start: 2024-11-26 | End: 2025-11-26

## 2024-11-26 RX ORDER — METHOTREXATE 2.5 MG/1
10 TABLET ORAL
Qty: 52 TABLET | Refills: 3 | Status: SHIPPED | OUTPATIENT
Start: 2024-11-26 | End: 2025-11-26

## 2024-11-26 ASSESSMENT — ENCOUNTER SYMPTOMS
NECK STIFFNESS: 1
FATIGUE: 1
NECK PAIN: 1
DIZZINESS: 1

## 2024-11-26 NOTE — PROGRESS NOTES
Subjective   Patient ID: Dominguez Ross is a 79 y.o. male who presents for Follow-up (4 MONTH FOLLOW UP ).  HPI  Patient with history of polymyalgia rheumatica with elevated CPK and inflammatory arthritis.    Patient was last seen in July and at that time he had some increase in inflammatory arthritic symptoms and we increased his methotrexate and prednisone again.  Unfortunately was COVID-positive in September.  He had seen Dr. Marinelli Earlier this month for bilateral hand pain.  Suspected bilateral carpal tunnel syndrome and discussed about conservative therapies such as bracing.  He also had gone to the urgent care in early November with cold symptoms.  He only did the increase for a month.  His symptoms are more due to CTS.  Sometimes the numbness is ok at night, but in the AM he has a lot of numbness in the 2,3 digits in both hands.  He still has his vertigo.  The PT did help.    He strained his neck the other day and has a salonpas lidocaine patch  Review of Systems   Constitutional:  Positive for fatigue.   Musculoskeletal:  Positive for neck pain and neck stiffness.   Neurological:  Positive for dizziness.       Objective   Physical Exam  GEN: NAD A&O x3  HEENT:no conjunctival redness, wearing glasses  NEURO: able to get out of chair without issues good strength in his upper and lower extremities  SKIN: no rashes  PULSES: +radials  MSK: no current swelling in the dip pip m some mild fullness in the PIPs and the wrists wrist elbows shoulders knees ankles.      Assessment/Plan       Elevated CPK history of PMR symptoms with elevated ESR with history of inflammatory arthritis. Has tried steroid sparing agents but did not change his elevated cpk.     -Patient's hand symptoms are more due to carpal tunnel.  He continues to be on methotrexate 10 mg and prednisone 2.5 mg.  He has been trying to brace but still has continuous symptoms even during the day.  Encouraged him to move forward with surgery.  Will have him  do blood work.  Will see him again in 3 to 4 months or as needed

## 2024-12-04 ENCOUNTER — TELEPHONE (OUTPATIENT)
Dept: ORTHOPEDIC SURGERY | Facility: CLINIC | Age: 79
End: 2024-12-04
Payer: MEDICARE

## 2024-12-04 DIAGNOSIS — G56.02 CARPAL TUNNEL SYNDROME ON LEFT: ICD-10-CM

## 2024-12-04 DIAGNOSIS — G56.01 CARPAL TUNNEL SYNDROME ON RIGHT: ICD-10-CM

## 2024-12-05 ENCOUNTER — APPOINTMENT (OUTPATIENT)
Dept: PRIMARY CARE | Facility: CLINIC | Age: 79
End: 2024-12-05
Payer: MEDICARE

## 2024-12-05 ENCOUNTER — TELEPHONE (OUTPATIENT)
Dept: ORTHOPEDIC SURGERY | Facility: CLINIC | Age: 79
End: 2024-12-05

## 2024-12-05 NOTE — TELEPHONE ENCOUNTER
Patient called wanting to schedule his surgery if you could please call him back     P: [phone]

## 2024-12-06 ENCOUNTER — HOSPITAL ENCOUNTER (OUTPATIENT)
Facility: HOSPITAL | Age: 79
Setting detail: OUTPATIENT SURGERY
End: 2024-12-06
Attending: ORTHOPAEDIC SURGERY | Admitting: ORTHOPAEDIC SURGERY
Payer: MEDICARE

## 2024-12-06 PROBLEM — G56.02 CARPAL TUNNEL SYNDROME ON LEFT: Status: ACTIVE | Noted: 2024-12-04

## 2024-12-06 PROBLEM — G56.01 CARPAL TUNNEL SYNDROME ON RIGHT: Status: ACTIVE | Noted: 2024-12-04

## 2024-12-11 ENCOUNTER — APPOINTMENT (OUTPATIENT)
Dept: PRIMARY CARE | Facility: CLINIC | Age: 79
End: 2024-12-11
Payer: MEDICARE

## 2024-12-11 ENCOUNTER — LAB (OUTPATIENT)
Dept: LAB | Facility: LAB | Age: 79
End: 2024-12-11
Payer: MEDICARE

## 2024-12-11 VITALS
HEIGHT: 71 IN | HEART RATE: 60 BPM | DIASTOLIC BLOOD PRESSURE: 80 MMHG | BODY MASS INDEX: 21.28 KG/M2 | WEIGHT: 152 LBS | SYSTOLIC BLOOD PRESSURE: 133 MMHG | OXYGEN SATURATION: 97 %

## 2024-12-11 DIAGNOSIS — M06.4 INFLAMMATORY POLYARTHRITIS (MULTI): ICD-10-CM

## 2024-12-11 DIAGNOSIS — Z00.00 ROUTINE GENERAL MEDICAL EXAMINATION AT HEALTH CARE FACILITY: Primary | ICD-10-CM

## 2024-12-11 DIAGNOSIS — E03.9 ACQUIRED HYPOTHYROIDISM: ICD-10-CM

## 2024-12-11 DIAGNOSIS — E78.2 MIXED HYPERLIPIDEMIA: ICD-10-CM

## 2024-12-11 DIAGNOSIS — R42 VERTIGO: ICD-10-CM

## 2024-12-11 LAB — TSH SERPL-ACNC: 1.17 MIU/L (ref 0.44–3.98)

## 2024-12-11 PROCEDURE — 84443 ASSAY THYROID STIM HORMONE: CPT

## 2024-12-11 PROCEDURE — 36415 COLL VENOUS BLD VENIPUNCTURE: CPT

## 2024-12-11 PROCEDURE — G0439 PPPS, SUBSEQ VISIT: HCPCS | Performed by: NURSE PRACTITIONER

## 2024-12-11 PROCEDURE — 1160F RVW MEDS BY RX/DR IN RCRD: CPT | Performed by: NURSE PRACTITIONER

## 2024-12-11 PROCEDURE — 1124F ACP DISCUSS-NO DSCNMKR DOCD: CPT | Performed by: NURSE PRACTITIONER

## 2024-12-11 PROCEDURE — 1170F FXNL STATUS ASSESSED: CPT | Performed by: NURSE PRACTITIONER

## 2024-12-11 PROCEDURE — 1159F MED LIST DOCD IN RCRD: CPT | Performed by: NURSE PRACTITIONER

## 2024-12-11 PROCEDURE — 1036F TOBACCO NON-USER: CPT | Performed by: NURSE PRACTITIONER

## 2024-12-11 PROCEDURE — 99213 OFFICE O/P EST LOW 20 MIN: CPT | Performed by: NURSE PRACTITIONER

## 2024-12-11 ASSESSMENT — ACTIVITIES OF DAILY LIVING (ADL)
TAKING_MEDICATION: INDEPENDENT
GROCERY_SHOPPING: INDEPENDENT
DOING_HOUSEWORK: INDEPENDENT
MANAGING_FINANCES: INDEPENDENT
DRESSING: INDEPENDENT
BATHING: INDEPENDENT

## 2024-12-11 ASSESSMENT — ENCOUNTER SYMPTOMS
OCCASIONAL FEELINGS OF UNSTEADINESS: 1
LOSS OF SENSATION IN FEET: 0
DEPRESSION: 0

## 2024-12-11 ASSESSMENT — PATIENT HEALTH QUESTIONNAIRE - PHQ9
2. FEELING DOWN, DEPRESSED OR HOPELESS: NOT AT ALL
SUM OF ALL RESPONSES TO PHQ9 QUESTIONS 1 AND 2: 0
1. LITTLE INTEREST OR PLEASURE IN DOING THINGS: NOT AT ALL

## 2024-12-11 NOTE — PROGRESS NOTES
"Subjective   Patient ID: Dominguez Ross is a 79 y.o. male who presents for Follow-up (Patient is here today for a follow up. Patient would like to discuss his blood pressure decreasing at times causing dizziness.) and Med Refill.    79 year old male here for annual medicare wellness.  He has PMH of HLD with statin use c/b elevated CPK, NSVT, mild AR, MR, TR, mild carotid atherosclerosis, hypothyroidism, polymyalgia rheumatica on methotrexate and prednisone.  Vertigo- Dr Reyez. central VNG abnormalities and cervical problems.   COVID 9/2024  Bilat carpal tunnel- Dr Marinelli. Will have surgery after the first of the year  Hypothyroid- on T4. Last set of lab with TSH 0.4, FT3, FT4 looked good. These labs are from 1/2024  HLD- welchol. Not on  statin- causes an increase in pain  Colon ca screen 7/2024- colonoscopy  LT ankle \"puffy\" non tender.   HX back surgery 1998. Chronic back pain.   Will be traveling to [place]  Due to age, screening is UTD  Was started on BB by cardio. The dose was decreased and the s&s improved but still there.     Med Refill         Review of Systems    Objective   /80   Pulse 60   Ht 1.803 m (5' 11\")   Wt 68.9 kg (152 lb)   SpO2 97%   BMI 21.20 kg/m²     Physical Exam  Constitutional:       Appearance: Normal appearance. He is normal weight.   Eyes:      Extraocular Movements: Extraocular movements intact.      Pupils: Pupils are equal, round, and reactive to light.   Cardiovascular:      Rate and Rhythm: Normal rate and regular rhythm.      Pulses: Normal pulses.      Heart sounds: Normal heart sounds.   Pulmonary:      Effort: Pulmonary effort is normal.      Breath sounds: Normal breath sounds.   Musculoskeletal:         General: No swelling. Normal range of motion.      Comments: LT ankle joint mildly larger than RT. Non tender. No edema.    Skin:     General: Skin is warm and dry.   Neurological:      General: No focal deficit present.      Mental Status: He is alert and " oriented to person, place, and time. Mental status is at baseline.   Psychiatric:         Mood and Affect: Mood normal.         Behavior: Behavior normal.         Thought Content: Thought content normal.         Assessment/Plan   Diagnoses and all orders for this visit:  Routine general medical examination at health care facility  -     1 Year Follow Up In Primary Care - Wellness Exam; Future  Mixed hyperlipidemia  Comments:  cannot take statins  Acquired hypothyroidism  Comments:  update lab  Orders:  -     Thyroid Stimulating Hormone; Future  Inflammatory polyarthritis (Multi)  Comments:  followed by rheum  Vertigo  Comments:  I wonder if the Beta Blocker is causing . I asked pt to ask cardio. It may be beneficial  to stop, monitor and poss add something diff for HTN.    He will not let me adjust the BB he wants to talk to cardiology

## 2025-01-14 NOTE — DISCHARGE INSTRUCTIONS
Dr. Marinelli Post-Operative Instructions  Hand/Wrist/Elbow    Activity:  Rest on the day of surgery.  Gradually resume your regular diet.    Anesthesia:  Numbing medication may last for 8-24 hours.    Post-Operative Medications:  You may resume your regular medications (including blood thinners).  You have been given a prescription for pain medication as needed.  You may also take over-the-counter anti-inflammatories and/or Tylenol for pain relief.  If you are taking the prescribed pain medication you must limit additional Tylenol (acetaminophen) intake to avoid overdose.    Dressings:  Keep your dressings clean and dry.  You may cover with a plastic bag or cast cover and seal bag to your skin above the bandage for showering.  If your dressing becomes wet or significantly bloodstained call the office at [phone].  Okay to remove outer dressings after 2-3 days and shower/wash hands.  Do not soak wound (I.e. no swimming pool, hot tub or dishes).    Post-Operative Care:  Keep the surgical site elevated above the level of your heart to limit swelling.  If your fingers are not included in the dressing you are encouraged to move your fingers regularly (full fist and full extension).  Regularly ice the surgical site.  You may also apply ice pack above the level of the dressing and this will cool the blood as it travels towards the surgical site.    Call Surgeon/Office at any time for:           Office Number: [phone]  Excessive bleeding  Loss of feeling (The local numbing medicine from surgery typically lasts 8-12 hours.  Nerve blocks can last 18-36 hours.)  Tight dressing: Make sure that you are elevating the operative site appropriately.  If no relief then call the office.                                                                                                        Circulation issues:  If fingers change to white or blue  Concerns/Problems with your surgery

## 2025-01-15 ENCOUNTER — HOSPITAL ENCOUNTER (OUTPATIENT)
Facility: HOSPITAL | Age: 80
Setting detail: OUTPATIENT SURGERY
Discharge: HOME | End: 2025-01-15
Attending: ORTHOPAEDIC SURGERY | Admitting: ORTHOPAEDIC SURGERY
Payer: MEDICARE

## 2025-01-15 VITALS
HEART RATE: 60 BPM | RESPIRATION RATE: 17 BRPM | SYSTOLIC BLOOD PRESSURE: 107 MMHG | OXYGEN SATURATION: 97 % | BODY MASS INDEX: 20.68 KG/M2 | DIASTOLIC BLOOD PRESSURE: 76 MMHG | TEMPERATURE: 97.2 F | HEIGHT: 71 IN | WEIGHT: 147.71 LBS

## 2025-01-15 DIAGNOSIS — G56.02 CARPAL TUNNEL SYNDROME ON LEFT: Primary | ICD-10-CM

## 2025-01-15 PROCEDURE — 3600000008 HC OR TIME - EACH INCREMENTAL 1 MINUTE - PROCEDURE LEVEL THREE: Performed by: ORTHOPAEDIC SURGERY

## 2025-01-15 PROCEDURE — 64721 CARPAL TUNNEL SURGERY: CPT | Performed by: ORTHOPAEDIC SURGERY

## 2025-01-15 PROCEDURE — 7100000010 HC PHASE TWO TIME - EACH INCREMENTAL 1 MINUTE: Performed by: ORTHOPAEDIC SURGERY

## 2025-01-15 PROCEDURE — 7100000009 HC PHASE TWO TIME - INITIAL BASE CHARGE: Performed by: ORTHOPAEDIC SURGERY

## 2025-01-15 PROCEDURE — 2500000005 HC RX 250 GENERAL PHARMACY W/O HCPCS: Performed by: ORTHOPAEDIC SURGERY

## 2025-01-15 PROCEDURE — 3600000003 HC OR TIME - INITIAL BASE CHARGE - PROCEDURE LEVEL THREE: Performed by: ORTHOPAEDIC SURGERY

## 2025-01-15 PROCEDURE — 2500000004 HC RX 250 GENERAL PHARMACY W/ HCPCS (ALT 636 FOR OP/ED): Performed by: ORTHOPAEDIC SURGERY

## 2025-01-15 RX ORDER — OXYCODONE HYDROCHLORIDE 5 MG/1
5 TABLET ORAL EVERY 6 HOURS PRN
Qty: 5 TABLET | Refills: 0 | Status: SHIPPED | OUTPATIENT
Start: 2025-01-15

## 2025-01-15 RX ORDER — SODIUM CHLORIDE 0.9 G/100ML
IRRIGANT IRRIGATION AS NEEDED
Status: DISCONTINUED | OUTPATIENT
Start: 2025-01-15 | End: 2025-01-15 | Stop reason: HOSPADM

## 2025-01-15 RX ORDER — CHLORHEXIDINE GLUCONATE 40 MG/ML
SOLUTION TOPICAL AS NEEDED
Status: DISCONTINUED | OUTPATIENT
Start: 2025-01-15 | End: 2025-01-15 | Stop reason: HOSPADM

## 2025-01-15 ASSESSMENT — PAIN - FUNCTIONAL ASSESSMENT: PAIN_FUNCTIONAL_ASSESSMENT: 0-10

## 2025-01-15 ASSESSMENT — PAIN SCALES - GENERAL: PAINLEVEL_OUTOF10: 0 - NO PAIN

## 2025-01-15 NOTE — H&P (VIEW-ONLY)
Failure of conservative treatment for left carpal tunnel syndrome    Past Medical History:   Diagnosis Date    Abnormal finding of blood chemistry, unspecified 12/30/2013    Abnormal blood chemistry    Abnormal levels of other serum enzymes 03/10/2020    Elevated CPK    Abrasion of left upper arm, initial encounter 09/04/2019    Arm abrasion, left, initial encounter    Acquired trigger finger 05/10/2023    Acute upper respiratory infection, unspecified 12/29/2017    Acute upper respiratory infection    Autoimmune thyroiditis     Hashimoto's thyroiditis    Bilateral shoulder pain 05/10/2023    Colon adenomas 05/10/2023    Elevated CPK 08/17/2022    Encounter for general adult medical examination without abnormal findings 11/17/2017    Annual physical exam    Hemorrhage of anus and rectum 04/03/2019    Bright red rectal bleeding    Myalgia, unspecified site     Muscular aches    Neck pain on left side 05/10/2023    Nuclear senile cataract 07/24/2018    Formatting of this note might be different from the original. Added automatically from request for surgery [id number] Formatting of this note might be different from the original. Added automatically from request for surgery [id number]    Occlusion and stenosis of bilateral carotid arteries 02/18/2023    Formatting of this note might be different from the original. Mild Test done at  2/2023    Other conditions influencing health status     Inflammatory Myopathy (Myositis)    Other conditions influencing health status     Nephrolithiasis Of The Right Kidney    Other conditions influencing health status     Induced CPK Elevation    Other conditions influencing health status 01/20/2016    History of cough    Other conditions influencing health status 11/17/2017    Chronic steroid use    Other disorders of iron metabolism     Iron overload    Pain in unspecified joint     Arthralgia of multiple sites    Personal history of other (healed) physical injury and trauma 11/03/2017     History of insect bite    Personal history of other diseases of male genital organs 10/02/2012    History of benign prostatic hypertrophy    Personal history of other diseases of the digestive system     History of hemorrhoids    Personal history of other diseases of the musculoskeletal system and connective tissue     History of polymyalgia rheumatica    Personal history of other diseases of the nervous system and sense organs     History of carpal tunnel syndrome    Personal history of other diseases of the nervous system and sense organs     History of peripheral neuropathy    Personal history of other diseases of the respiratory system 05/01/2018    History of acute sinusitis    Personal history of other diseases of the respiratory system 11/12/2015    History of acute pharyngitis    Personal history of other diseases of the respiratory system 03/09/2016    History of acute bronchitis    Personal history of other drug therapy 11/15/2016    History of influenza vaccination    Personal history of other endocrine, nutritional and metabolic disease     History of hypercholesterolemia    Personal history of other endocrine, nutritional and metabolic disease 11/17/2017    History of vitamin D deficiency    Personal history of other specified conditions     History of abdominal pain    Personal history of other specified conditions 02/12/2013    History of chest pain    Polyneuropathy, unspecified     Polyneuropathy    Skin change 05/10/2023    Sprain of joints and ligaments of other parts of neck, initial encounter     Sprain of joints and ligaments of other parts of neck    Supraventricular tachycardia (CMS-HCC) 12/16/2019       Medication Documentation Review Audit       Reviewed by Nova Cordero RN (Registered Nurse) on 01/15/25 at 0641      Medication Order Taking? Sig Documenting Provider Last Dose Status   aspirin 81 mg EC tablet [id number]  Take 1 tablet (81 mg) by mouth once daily. Historical Provider,  MD  Active   colesevelam (Welchol) 625 mg tablet [id number] Yes Take 1 tablet (625 mg) by mouth 2 times daily (morning and late afternoon). Historical Provider, MD 2025 Evening Active   folic acid (Folvite) 1 mg tablet [id number] Yes Take 1 tablet (1 mg) by mouth once daily. Historical Provider, MD 2025 Active   levothyroxine (Synthroid, Levoxyl) 88 mcg tablet [id number] Yes Take 1 tablet (88 mcg) by mouth once daily in the morning. Take before meals. Rosalee Vanessa, APRN-CNP 1/15/2025 Morning Active   methotrexate (Trexall) 2.5 mg tablet [id number] No Take 4 tablets (10 mg total) by mouth 1 (one) time per week. Massiel Caballero MD 2025 Active   metoprolol succinate XL (Toprol-XL) 50 mg 24 hr tablet [id number] Yes Take 1 tablet (50 mg) by mouth once daily. Historical Provider, MD 2025 Evening Active   predniSONE (Deltasone) 5 mg tablet [id number] Yes Take 0.5 tablets (2.5 mg) by mouth once daily. Massiel Caballero MD 1/15/2025 Morning Active   tamsulosin (Flomax) 0.4 mg 24 hr capsule [id number] Yes TAKE 1 CAPSULE(0.4 MG) BY MOUTH EVERY DAY Nova Becerra, APRN-CNP 2025 Active                    Allergies   Allergen Reactions    Statins-Hmg-Coa Reductase Inhibitors Other       Social History     Socioeconomic History    Marital status:      Spouse name: Not on file    Number of children: Not on file    Years of education: Not on file    Highest education level: Not on file   Occupational History    Not on file   Tobacco Use    Smoking status: Former     Current packs/day: 0.00     Types: Cigarettes     Quit date: 1968     Years since quittin.0    Smokeless tobacco: Never   Vaping Use    Vaping status: Never Used   Substance and Sexual Activity    Alcohol use: Yes     Comment: social    Drug use: Never    Sexual activity: Not on file   Other Topics Concern    Not on file   Social History Narrative    Not on file     Social Drivers of Health     Financial Resource Strain: Not on  file   Food Insecurity: Not on file   Transportation Needs: Not on file   Physical Activity: Not on file   Stress: Not on file   Social Connections: Not on file   Intimate Partner Violence: Not on file   Housing Stability: Not on file       Past Surgical History:   Procedure Laterality Date    BACK SURGERY  08/02/2012    Lower Back Surgery    COLONOSCOPY  10/29/2012    Complete Colonoscopy    MR HEAD ANGIO WO IV CONTRAST  5/24/2023    MR HEAD ANGIO WO IV CONTRAST [facility]    OTHER SURGICAL HISTORY  08/02/2012    Biopsy Muscle          Review of Systems   GENERAL: Negative for malaise, significant weight loss, fever  MUSCULOSKELETAL: see HPI  NEURO:  see HPI     Physical Examination:  Constitutional: Appears well-developed and well-nourished.  Head: Normocephalic and atraumatic.  Eyes: EOMI grossly  Cardiovascular: Intact distal pulses.   Respiratory: Effort normal. No respiratory distress.  Neurologic: Alert and oriented to person, place, and time.  Skin: Skin is warm and dry.  Hematologic / Lymphatic: No lymphedema, lymphangitis.  Psychiatric: normal mood and affect. Behavior is normal.   Musculoskeletal:  left wrist/hand:  Positive Durkan's.  Diminished sensation median nerve distribution    Left carpal tunnel syndrome, plan for left open carpal tunnel release as scheduled.

## 2025-01-15 NOTE — H&P
Failure of conservative treatment for left carpal tunnel syndrome    Past Medical History:   Diagnosis Date    Abnormal finding of blood chemistry, unspecified 12/30/2013    Abnormal blood chemistry    Abnormal levels of other serum enzymes 03/10/2020    Elevated CPK    Abrasion of left upper arm, initial encounter 09/04/2019    Arm abrasion, left, initial encounter    Acquired trigger finger 05/10/2023    Acute upper respiratory infection, unspecified 12/29/2017    Acute upper respiratory infection    Autoimmune thyroiditis     Hashimoto's thyroiditis    Bilateral shoulder pain 05/10/2023    Colon adenomas 05/10/2023    Elevated CPK 08/17/2022    Encounter for general adult medical examination without abnormal findings 11/17/2017    Annual physical exam    Hemorrhage of anus and rectum 04/03/2019    Bright red rectal bleeding    Myalgia, unspecified site     Muscular aches    Neck pain on left side 05/10/2023    Nuclear senile cataract 07/24/2018    Formatting of this note might be different from the original. Added automatically from request for surgery [id number] Formatting of this note might be different from the original. Added automatically from request for surgery [id number]    Occlusion and stenosis of bilateral carotid arteries 02/18/2023    Formatting of this note might be different from the original. Mild Test done at  2/2023    Other conditions influencing health status     Inflammatory Myopathy (Myositis)    Other conditions influencing health status     Nephrolithiasis Of The Right Kidney    Other conditions influencing health status     Induced CPK Elevation    Other conditions influencing health status 01/20/2016    History of cough    Other conditions influencing health status 11/17/2017    Chronic steroid use    Other disorders of iron metabolism     Iron overload    Pain in unspecified joint     Arthralgia of multiple sites    Personal history of other (healed) physical injury and trauma 11/03/2017     History of insect bite    Personal history of other diseases of male genital organs 10/02/2012    History of benign prostatic hypertrophy    Personal history of other diseases of the digestive system     History of hemorrhoids    Personal history of other diseases of the musculoskeletal system and connective tissue     History of polymyalgia rheumatica    Personal history of other diseases of the nervous system and sense organs     History of carpal tunnel syndrome    Personal history of other diseases of the nervous system and sense organs     History of peripheral neuropathy    Personal history of other diseases of the respiratory system 05/01/2018    History of acute sinusitis    Personal history of other diseases of the respiratory system 11/12/2015    History of acute pharyngitis    Personal history of other diseases of the respiratory system 03/09/2016    History of acute bronchitis    Personal history of other drug therapy 11/15/2016    History of influenza vaccination    Personal history of other endocrine, nutritional and metabolic disease     History of hypercholesterolemia    Personal history of other endocrine, nutritional and metabolic disease 11/17/2017    History of vitamin D deficiency    Personal history of other specified conditions     History of abdominal pain    Personal history of other specified conditions 02/12/2013    History of chest pain    Polyneuropathy, unspecified     Polyneuropathy    Skin change 05/10/2023    Sprain of joints and ligaments of other parts of neck, initial encounter     Sprain of joints and ligaments of other parts of neck    Supraventricular tachycardia (CMS-HCC) 12/16/2019       Medication Documentation Review Audit       Reviewed by Nova Cordero RN (Registered Nurse) on 01/15/25 at 0641      Medication Order Taking? Sig Documenting Provider Last Dose Status   aspirin 81 mg EC tablet [id number]  Take 1 tablet (81 mg) by mouth once daily. Historical Provider,  MD  Active   colesevelam (Welchol) 625 mg tablet [id number] Yes Take 1 tablet (625 mg) by mouth 2 times daily (morning and late afternoon). Historical Provider, MD 2025 Evening Active   folic acid (Folvite) 1 mg tablet [id number] Yes Take 1 tablet (1 mg) by mouth once daily. Historical Provider, MD 2025 Active   levothyroxine (Synthroid, Levoxyl) 88 mcg tablet [id number] Yes Take 1 tablet (88 mcg) by mouth once daily in the morning. Take before meals. Rosalee Vanessa, APRN-CNP 1/15/2025 Morning Active   methotrexate (Trexall) 2.5 mg tablet [id number] No Take 4 tablets (10 mg total) by mouth 1 (one) time per week. Massiel Caballero MD 2025 Active   metoprolol succinate XL (Toprol-XL) 50 mg 24 hr tablet [id number] Yes Take 1 tablet (50 mg) by mouth once daily. Historical Provider, MD 2025 Evening Active   predniSONE (Deltasone) 5 mg tablet [id number] Yes Take 0.5 tablets (2.5 mg) by mouth once daily. Massiel Caballero MD 1/15/2025 Morning Active   tamsulosin (Flomax) 0.4 mg 24 hr capsule [id number] Yes TAKE 1 CAPSULE(0.4 MG) BY MOUTH EVERY DAY Nova Becerra, APRN-CNP 2025 Active                    Allergies   Allergen Reactions    Statins-Hmg-Coa Reductase Inhibitors Other       Social History     Socioeconomic History    Marital status:      Spouse name: Not on file    Number of children: Not on file    Years of education: Not on file    Highest education level: Not on file   Occupational History    Not on file   Tobacco Use    Smoking status: Former     Current packs/day: 0.00     Types: Cigarettes     Quit date: 1968     Years since quittin.0    Smokeless tobacco: Never   Vaping Use    Vaping status: Never Used   Substance and Sexual Activity    Alcohol use: Yes     Comment: social    Drug use: Never    Sexual activity: Not on file   Other Topics Concern    Not on file   Social History Narrative    Not on file     Social Drivers of Health     Financial Resource Strain: Not on  file   Food Insecurity: Not on file   Transportation Needs: Not on file   Physical Activity: Not on file   Stress: Not on file   Social Connections: Not on file   Intimate Partner Violence: Not on file   Housing Stability: Not on file       Past Surgical History:   Procedure Laterality Date    BACK SURGERY  08/02/2012    Lower Back Surgery    COLONOSCOPY  10/29/2012    Complete Colonoscopy    MR HEAD ANGIO WO IV CONTRAST  5/24/2023    MR HEAD ANGIO WO IV CONTRAST [facility]    OTHER SURGICAL HISTORY  08/02/2012    Biopsy Muscle          Review of Systems   GENERAL: Negative for malaise, significant weight loss, fever  MUSCULOSKELETAL: see HPI  NEURO:  see HPI     Physical Examination:  Constitutional: Appears well-developed and well-nourished.  Head: Normocephalic and atraumatic.  Eyes: EOMI grossly  Cardiovascular: Intact distal pulses.   Respiratory: Effort normal. No respiratory distress.  Neurologic: Alert and oriented to person, place, and time.  Skin: Skin is warm and dry.  Hematologic / Lymphatic: No lymphedema, lymphangitis.  Psychiatric: normal mood and affect. Behavior is normal.   Musculoskeletal:  left wrist/hand:  Positive Durkan's.  Diminished sensation median nerve distribution    Left carpal tunnel syndrome, plan for left open carpal tunnel release as scheduled.

## 2025-01-15 NOTE — OP NOTE
Pre-Operative Diagnosis: left carpal tunnel syndrome  Post-Operative Diagnosis: same  Procedure: left carpal tunnel release  Surgeon: Yves  Assistant: None  Anesthesia: Local   Complications: none  Estimated blood loss: minimal  Specimen: none  Findings: See below  Disposition: Good/PACU      Indications: Patient has failed conservative treatment for left carpal tunnel syndrome. After reviewing risks and benefits of continued nonoperative versus operative treatment they have decided to proceed with open carpal tunnel release. Patient understands that primary goal of surgery is to prevent further worsening of symptoms, but that there is no guarantee. Expected operative and postoperative courses reviewed and all questions addressed.    Operative course: Patient was greeted in the preoperative holding area and the operative site was marked with indelible marker.  After confirming patient identifiers and surgical site a mixture of lidocaine with epinephrine and sodium bicarbonate was infiltrated about the planned incision site using sterile technique.  Patient was brought back to the operating room suite where left upper extremity was prepped and draped in standard sterile fashion and timeout procedure was performed as per standard protocol.  Longitudinal incision made overlying level of transverse carpal ligament in line with the radial border of the ring finger. Gentle spreading was carried down through the palmar fascia and transverse carpal ligament was identified and sharply released in a distal to proximal direction under direct visualization. Complete release proximally was confirmed visually as well as by palpation. Gentle spreading distally also confirmed complete release with good visualization of the distal sentinel fat.  Median nerve was inspected and confirmed to be fully intact. Wound was then irrigated and reapproximated with 4-0 Monocryl suture in a buried interrupted fashion followed by Dermabond on  the skin.  Dry sterile dressings were applied and patient was taken to the recovery room for further care.    Patient will follow-up in 2 weeks for clinical check.

## 2025-01-20 ENCOUNTER — TELEPHONE (OUTPATIENT)
Dept: ORTHOPEDIC SURGERY | Facility: CLINIC | Age: 80
End: 2025-01-20
Payer: MEDICARE

## 2025-01-20 DIAGNOSIS — N40.0 BENIGN PROSTATIC HYPERPLASIA WITHOUT LOWER URINARY TRACT SYMPTOMS: ICD-10-CM

## 2025-01-20 NOTE — TELEPHONE ENCOUNTER
Rt. CTS scheduled 1/29/25.    Patient would like to reschedule his 1/29 surgery to sometime in [facility].    Please call patient to caneel 1/29 and reschedule.

## 2025-01-20 NOTE — TELEPHONE ENCOUNTER
Will call patient to re-schedule sometime this week. I will cancel the previously schedule surgery

## 2025-01-21 RX ORDER — TAMSULOSIN HYDROCHLORIDE 0.4 MG/1
0.4 CAPSULE ORAL DAILY
Qty: 90 CAPSULE | Refills: 1 | Status: SHIPPED | OUTPATIENT
Start: 2025-01-21

## 2025-01-22 DIAGNOSIS — G56.01 CARPAL TUNNEL SYNDROME ON RIGHT: ICD-10-CM

## 2025-01-23 DIAGNOSIS — E03.9 ACQUIRED HYPOTHYROIDISM: ICD-10-CM

## 2025-01-27 RX ORDER — LEVOTHYROXINE SODIUM 88 UG/1
88 TABLET ORAL
Qty: 90 TABLET | Refills: 1 | Status: SHIPPED | OUTPATIENT
Start: 2025-01-27

## 2025-02-14 ENCOUNTER — APPOINTMENT (OUTPATIENT)
Dept: ORTHOPEDIC SURGERY | Facility: CLINIC | Age: 80
End: 2025-02-14
Payer: MEDICARE

## 2025-02-14 ENCOUNTER — HOSPITAL ENCOUNTER (OUTPATIENT)
Facility: CLINIC | Age: 80
Setting detail: OUTPATIENT SURGERY
Discharge: HOME | End: 2025-02-14
Attending: ORTHOPAEDIC SURGERY | Admitting: ORTHOPAEDIC SURGERY
Payer: MEDICARE

## 2025-02-14 VITALS
BODY MASS INDEX: 21.57 KG/M2 | DIASTOLIC BLOOD PRESSURE: 74 MMHG | RESPIRATION RATE: 16 BRPM | HEIGHT: 71 IN | WEIGHT: 154.1 LBS | OXYGEN SATURATION: 100 % | HEART RATE: 57 BPM | TEMPERATURE: 97 F | SYSTOLIC BLOOD PRESSURE: 121 MMHG

## 2025-02-14 DIAGNOSIS — G56.01 CARPAL TUNNEL SYNDROME ON RIGHT: Primary | ICD-10-CM

## 2025-02-14 PROCEDURE — 7100000009 HC PHASE TWO TIME - INITIAL BASE CHARGE: Performed by: ORTHOPAEDIC SURGERY

## 2025-02-14 PROCEDURE — 64721 CARPAL TUNNEL SURGERY: CPT | Performed by: ORTHOPAEDIC SURGERY

## 2025-02-14 PROCEDURE — 7100000010 HC PHASE TWO TIME - EACH INCREMENTAL 1 MINUTE: Performed by: ORTHOPAEDIC SURGERY

## 2025-02-14 PROCEDURE — 2500000005 HC RX 250 GENERAL PHARMACY W/O HCPCS: Performed by: ORTHOPAEDIC SURGERY

## 2025-02-14 PROCEDURE — 3600000008 HC OR TIME - EACH INCREMENTAL 1 MINUTE - PROCEDURE LEVEL THREE: Performed by: ORTHOPAEDIC SURGERY

## 2025-02-14 PROCEDURE — 3600000003 HC OR TIME - INITIAL BASE CHARGE - PROCEDURE LEVEL THREE: Performed by: ORTHOPAEDIC SURGERY

## 2025-02-14 PROCEDURE — 2500000004 HC RX 250 GENERAL PHARMACY W/ HCPCS (ALT 636 FOR OP/ED): Performed by: ORTHOPAEDIC SURGERY

## 2025-02-14 RX ORDER — SODIUM CHLORIDE 0.9 G/100ML
INJECTION, SOLUTION IRRIGATION AS NEEDED
Status: DISCONTINUED | OUTPATIENT
Start: 2025-02-14 | End: 2025-02-14 | Stop reason: HOSPADM

## 2025-02-14 RX ORDER — LIDOCAINE HYDROCHLORIDE AND EPINEPHRINE 10; 10 UG/ML; MG/ML
INJECTION, SOLUTION INFILTRATION; PERINEURAL AS NEEDED
Status: DISCONTINUED | OUTPATIENT
Start: 2025-02-14 | End: 2025-02-14 | Stop reason: HOSPADM

## 2025-02-14 ASSESSMENT — COLUMBIA-SUICIDE SEVERITY RATING SCALE - C-SSRS
1. IN THE PAST MONTH, HAVE YOU WISHED YOU WERE DEAD OR WISHED YOU COULD GO TO SLEEP AND NOT WAKE UP?: NO
6. HAVE YOU EVER DONE ANYTHING, STARTED TO DO ANYTHING, OR PREPARED TO DO ANYTHING TO END YOUR LIFE?: NO
2. HAVE YOU ACTUALLY HAD ANY THOUGHTS OF KILLING YOURSELF?: NO

## 2025-02-14 ASSESSMENT — PAIN SCALES - GENERAL
PAINLEVEL_OUTOF10: 0 - NO PAIN
PAINLEVEL_OUTOF10: 0 - NO PAIN

## 2025-02-14 ASSESSMENT — PAIN - FUNCTIONAL ASSESSMENT
PAIN_FUNCTIONAL_ASSESSMENT: 0-10
PAIN_FUNCTIONAL_ASSESSMENT: 0-10

## 2025-02-14 NOTE — DISCHARGE INSTRUCTIONS
Dr. Marinelli Post-Operative Instructions  Hand/Wrist/Elbow    Activity:  Rest on the day of surgery.  Gradually resume your regular diet.    Anesthesia:  Numbing medication may last for 8-24 hours.    Post-Operative Medications:  You may resume your regular medications (including blood thinners).  You have been given a prescription for pain medication as needed.  You may also take over-the-counter anti-inflammatories and/or Tylenol for pain relief.  If you are taking the prescribed pain medication you must limit additional Tylenol (acetaminophen) intake to avoid overdose.    Dressings:  Keep your dressings clean and dry.  You may cover with a plastic bag or cast cover and seal bag to your skin above the bandage for showering.  If your dressing becomes wet or significantly bloodstained call the office at [phone].  Okay to remove outer dressings after 2-3 days and shower/wash hands.  Do not soak wound (I.e. no swimming pool, hot tub or dishes).    Post-Operative Care:  Keep the surgical site elevated above the level of your heart to limit swelling.  If your fingers are not included in the dressing you are encouraged to move your fingers regularly (full fist and full extension).  Regularly ice the surgical site.  You may also apply ice pack above the level of the dressing and this will cool the blood as it travels towards the surgical site.    Call Surgeon/Office at any time for:           Office Number: [phone]  Excessive bleeding  Loss of feeling (The local numbing medicine from surgery typically lasts 8-12 hours.  Nerve blocks can last 18-36 hours.)  Tight dressing: Make sure that you are elevating the operative site appropriately.  If no relief then call the office.                                                                                                        Circulation issues:  If fingers change to white or blue  Concerns/Problems with your surgery

## 2025-02-14 NOTE — OP NOTE
Pre-Operative Diagnosis: right carpal tunnel syndrome  Post-Operative Diagnosis: same  Procedure: right carpal tunnel release  Surgeon: Yves  Assistant: None  Anesthesia: Local   Complications: none  Estimated blood loss: minimal  Specimen: none  Findings: See below  Disposition: Good/PACU      Indications: Patient has failed conservative treatment for right carpal tunnel syndrome.  Left hand progressing well following carpal tunnel release with good relief of preoperative symptoms.  After reviewing risks and benefits of continued nonoperative versus operative treatment they have decided to proceed with open carpal tunnel release. Patient understands that primary goal of surgery is to prevent further worsening of symptoms, but that there is no guarantee. Expected operative and postoperative courses reviewed and all questions addressed.  Patient was noted to have right middle and ring finger trigger fingers in the preoperative holding area.  Recommendation for surgical intervention was discussed, but after thoroughly reviewing associated risk and benefits patient prefers to proceed with just the carpal tunnel release.  He understands potential for continued/worsening symptoms from the trigger fingers.    Operative course: Patient was greeted in the preoperative holding area and the operative site was marked with indelible marker.  After confirming patient identifiers and surgical site a 10 mL mixture of lidocaine with epinephrine and sodium and bicarbonate was infiltrated about the planned incision site using sterile technique.  Patient was brought back to the operating room suite where right upper extremity was prepped and draped in standard sterile fashion and timeout procedure was performed as per standard protocol.  Longitudinal incision made overlying level of transverse carpal ligament in line with the radial border of the ring finger. Gentle spreading was carried down through the palmar fascia and  transverse carpal ligament was identified and sharply released in a distal to proximal direction under direct visualization. Complete release proximally was confirmed visually as well as by palpation. Gentle spreading distally also confirmed complete release with visualization of approximately 5 mm of distal sentinel fat.  Median nerve was inspected and confirmed to be fully intact. Wound was then irrigated and reapproximated with 4-0 Monocryl suture in a buried interrupted fashion followed by Dermabond on the skin.  Dry sterile dressings were applied and patient was taken to the recovery room for further care.    Patient will follow-up in 2 weeks for clinical check.

## 2025-02-14 NOTE — INTERVAL H&P NOTE
H&P reviewed. The patient was examined and there are no changes to the H&P.    Patient was noted to have triggering of the right middle and ring fingers in the preoperative holding area.  We discussed recommendation for addressing these concurrently.  After thoroughly reviewing associated risks and benefits, patient prefers to proceed with just right carpal tunnel release.  He understands potential for worsening/continued symptoms from the trigger fingers.  Patient and his wife present for discussions.

## 2025-04-14 ENCOUNTER — OFFICE VISIT (OUTPATIENT)
Dept: URGENT CARE | Age: 80
End: 2025-04-14
Payer: MEDICARE

## 2025-04-14 VITALS
OXYGEN SATURATION: 98 % | RESPIRATION RATE: 18 BRPM | SYSTOLIC BLOOD PRESSURE: 108 MMHG | HEART RATE: 76 BPM | TEMPERATURE: 98.6 F | DIASTOLIC BLOOD PRESSURE: 71 MMHG

## 2025-04-14 DIAGNOSIS — J06.9 UPPER RESPIRATORY TRACT INFECTION, UNSPECIFIED TYPE: Primary | ICD-10-CM

## 2025-04-14 LAB
POC HUMAN RHINOVIRUS PCR: NEGATIVE
POC INFLUENZA A VIRUS PCR: NEGATIVE
POC INFLUENZA B VIRUS PCR: NEGATIVE
POC RESPIRATORY SYNCYTIAL VIRUS PCR: NEGATIVE
POC STREPTOCOCCUS PYOGENES (GROUP A STREP) PCR: NEGATIVE

## 2025-04-14 PROCEDURE — 1159F MED LIST DOCD IN RCRD: CPT | Performed by: PHYSICIAN ASSISTANT

## 2025-04-14 PROCEDURE — G8433 SCR FOR DEP NOT CPT DOC RSN: HCPCS | Performed by: PHYSICIAN ASSISTANT

## 2025-04-14 PROCEDURE — 87651 STREP A DNA AMP PROBE: CPT | Performed by: PHYSICIAN ASSISTANT

## 2025-04-14 PROCEDURE — 87631 RESP VIRUS 3-5 TARGETS: CPT | Performed by: PHYSICIAN ASSISTANT

## 2025-04-14 PROCEDURE — 1036F TOBACCO NON-USER: CPT | Performed by: PHYSICIAN ASSISTANT

## 2025-04-14 PROCEDURE — 99213 OFFICE O/P EST LOW 20 MIN: CPT | Performed by: PHYSICIAN ASSISTANT

## 2025-04-14 RX ORDER — GUAIFENESIN AND PSEUDOEPHEDRINE HCL 1200; 120 MG/1; MG/1
1 TABLET, EXTENDED RELEASE ORAL 2 TIMES DAILY
Qty: 20 TABLET | Refills: 0 | Status: SHIPPED | OUTPATIENT
Start: 2025-04-14

## 2025-04-14 ASSESSMENT — ENCOUNTER SYMPTOMS: SINUS COMPLAINT: 1

## 2025-04-14 NOTE — PROGRESS NOTES
Subjective   Patient ID: Dominguez Ross is a 79 y.o. male. They present today with a chief complaint of Sinus Problem (1 day ).    History of Present Illness  Patient is a very pleasant 79-year-old white male, past medical history of hyperlipidemia, hypothyroid, presenting to the clinic for chief complaint of rhinorrhea.  Patient is reporting 2-day history of upper respiratory congestion with clear runny rhinorrhea.  He denies any sinus pain or pressure.  Denies any apparent nasal discharge.  No postnasal drainage sore throat cough or sputum production.  No fever or chills.  No chest pain or shortness of breath.  States he took some Tylenol Cold and sinus yesterday with mild short-term improvement.  No abdominal pain, nausea, vomiting.  No chest pain or shortness of breath.  No myalgias, arthralgias, generalized weakness, fatigue, numbness, tingling, or focal weakness.      Sinus Problem      Past Medical History  Allergies as of 04/14/2025 - Reviewed 04/14/2025   Allergen Reaction Noted    Statins-hmg-coa reductase inhibitors Other 05/10/2023       (Not in a hospital admission)         Past Medical History:   Diagnosis Date    Abnormal finding of blood chemistry, unspecified 12/30/2013    Abnormal blood chemistry    Abnormal levels of other serum enzymes 03/10/2020    Elevated CPK    Abrasion of left upper arm, initial encounter 09/04/2019    Arm abrasion, left, initial encounter    Acquired trigger finger 05/10/2023    Acute upper respiratory infection, unspecified 12/29/2017    Acute upper respiratory infection    Autoimmune thyroiditis     Hashimoto's thyroiditis    Bilateral shoulder pain 05/10/2023    Colon adenomas 05/10/2023    Elevated CPK 08/17/2022    Encounter for general adult medical examination without abnormal findings 11/17/2017    Annual physical exam    Hemorrhage of anus and rectum 04/03/2019    Bright red rectal bleeding    Myalgia, unspecified site     Muscular aches    Neck pain on left side  05/10/2023    Nuclear senile cataract 07/24/2018    Formatting of this note might be different from the original. Added automatically from request for surgery [id number] Formatting of this note might be different from the original. Added automatically from request for surgery [id number]    Occlusion and stenosis of bilateral carotid arteries 02/18/2023    Formatting of this note might be different from the original. Mild Test done at  2/2023    Other conditions influencing health status     Inflammatory Myopathy (Myositis)    Other conditions influencing health status     Nephrolithiasis Of The Right Kidney    Other conditions influencing health status     Induced CPK Elevation    Other conditions influencing health status 01/20/2016    History of cough    Other conditions influencing health status 11/17/2017    Chronic steroid use    Other disorders of iron metabolism     Iron overload    Pain in unspecified joint     Arthralgia of multiple sites    Personal history of other (healed) physical injury and trauma 11/03/2017    History of insect bite    Personal history of other diseases of male genital organs 10/02/2012    History of benign prostatic hypertrophy    Personal history of other diseases of the digestive system     History of hemorrhoids    Personal history of other diseases of the musculoskeletal system and connective tissue     History of polymyalgia rheumatica    Personal history of other diseases of the nervous system and sense organs     History of carpal tunnel syndrome    Personal history of other diseases of the nervous system and sense organs     History of peripheral neuropathy    Personal history of other diseases of the respiratory system 05/01/2018    History of acute sinusitis    Personal history of other diseases of the respiratory system 11/12/2015    History of acute pharyngitis    Personal history of other diseases of the respiratory system 03/09/2016    History of acute bronchitis     Personal history of other drug therapy 11/15/2016    History of influenza vaccination    Personal history of other endocrine, nutritional and metabolic disease     History of hypercholesterolemia    Personal history of other endocrine, nutritional and metabolic disease 11/17/2017    History of vitamin D deficiency    Personal history of other specified conditions     History of abdominal pain    Personal history of other specified conditions 02/12/2013    History of chest pain    Polyneuropathy, unspecified     Polyneuropathy    Skin change 05/10/2023    Sprain of joints and ligaments of other parts of neck, initial encounter     Sprain of joints and ligaments of other parts of neck    Supraventricular tachycardia 12/16/2019       Past Surgical History:   Procedure Laterality Date    BACK SURGERY  08/02/2012    Lower Back Surgery    COLONOSCOPY  10/29/2012    Complete Colonoscopy    MR HEAD ANGIO WO IV CONTRAST  5/24/2023    MR HEAD ANGIO WO IV CONTRAST [facility]    OTHER SURGICAL HISTORY  08/02/2012    Biopsy Muscle        reports that he quit smoking about 57 years ago. His smoking use included cigarettes. He has never used smokeless tobacco. He reports current alcohol use. He reports that he does not use drugs.    Review of Systems  Review of Systems                               Objective    Vitals:    04/14/25 1115   BP: 108/71   Pulse: 76   Resp: 18   Temp: 37 °C (98.6 °F)   SpO2: 98%     No LMP for male patient.    Physical Exam  General: Vitals Noted. No distress. Normocephalic.     HEENT: TMs normal, EOMI, normal conjunctiva, patent nares with erythematous edematous and appear nasal turbinates and clear rhinorrhea bilaterally.  Posterior oropharynx with signs of postnasal drainage without any erythema swelling or tonsillar exudate.  Uvula is in the midline and nonedematous.  No drooling.  No trismus.    Neck: Supple with no adenopathy.     Cardiac: Regular Rate and Rhythm. No murmur.      Pulmonary: Equal breath sounds bilaterally. No wheezes, rhonchi, or rales.    Abdomen: Soft, non-tender, with normal bowel sounds.     Musculoskeletal: Moves all extremities, no effusion, no edema.     Skin: No obvious rashes.  Procedures    Point of Care Test & Imaging Results from this visit    Imaging  No results found.    Cardiology, Vascular, and Other Imaging  No other imaging results found for the past 2 days      Diagnostic study results (if any) were reviewed by Lizandro Lozano PA-C.    Assessment/Plan   Allergies, medications, history, and pertinent labs/EKGs/Imaging reviewed by Lizandro Lozano PA-C.     Medical Decision Making  atient was seen in the clinic with complaint of upper respiratory congestion.  On exam patient is nontoxic well-appearing despite comfortably no acute distress.  Vital signs are stable, afebrile.  Chest is clear, heart is regular, belly is diffusely soft and nontender.  ENT examination as above consistent with a viral illness.  BioFire was performed and returned negative.  Patient's only symptom is a rhinorrhea feel this is likely viral in nature.  Will provide Mucinex D to be is as needed for his congestion advised in plenty of clear fluids and use Tylenol or Profen as needed.  I advised to follow-up with his primary care physician in the next week.  I reviewed my impression, plan, strict return versus report to ED precautions with the patient.  He expresses understanding and agreement with plan of care.    Orders and Diagnoses  Diagnoses and all orders for this visit:  Upper respiratory tract infection, unspecified type  -     POCT SPOTFIRE R/ST Panel Mini w/Strep A ([facility]) manually resulted  -     pseudoephedrine-guaifenesin (Mucinex D Maximum Strength) 120-1,200 mg tablet extended release 12 hr; Take 1 tablet by mouth 2 times a day.        Medical Admin Record      Follow Up Instructions  No follow-ups on file.    Patient disposition: Home    Electronically  signed by Lizandro Lozano PA-C  11:45 AM

## 2025-05-27 ENCOUNTER — HOSPITAL ENCOUNTER (OUTPATIENT)
Dept: RADIOLOGY | Facility: HOSPITAL | Age: 80
Discharge: HOME | End: 2025-05-27
Payer: MEDICARE

## 2025-05-27 ENCOUNTER — APPOINTMENT (OUTPATIENT)
Dept: RHEUMATOLOGY | Facility: CLINIC | Age: 80
End: 2025-05-27
Payer: MEDICARE

## 2025-05-27 VITALS
DIASTOLIC BLOOD PRESSURE: 68 MMHG | BODY MASS INDEX: 21.67 KG/M2 | WEIGHT: 155.4 LBS | HEART RATE: 57 BPM | SYSTOLIC BLOOD PRESSURE: 105 MMHG | OXYGEN SATURATION: 100 %

## 2025-05-27 DIAGNOSIS — Z79.899 ENCOUNTER FOR LONG-TERM (CURRENT) USE OF MEDICATIONS: ICD-10-CM

## 2025-05-27 DIAGNOSIS — M35.3 POLYMYALGIA RHEUMATICA (MULTI): Primary | ICD-10-CM

## 2025-05-27 DIAGNOSIS — M25.562 LEFT KNEE PAIN, UNSPECIFIED CHRONICITY: ICD-10-CM

## 2025-05-27 DIAGNOSIS — M13.80 SERONEGATIVE INFLAMMATORY ARTHRITIS: ICD-10-CM

## 2025-05-27 PROCEDURE — 73562 X-RAY EXAM OF KNEE 3: CPT | Mod: LEFT SIDE | Performed by: RADIOLOGY

## 2025-05-27 PROCEDURE — 1159F MED LIST DOCD IN RCRD: CPT | Performed by: INTERNAL MEDICINE

## 2025-05-27 PROCEDURE — 73562 X-RAY EXAM OF KNEE 3: CPT | Mod: LT

## 2025-05-27 PROCEDURE — G2211 COMPLEX E/M VISIT ADD ON: HCPCS | Performed by: INTERNAL MEDICINE

## 2025-05-27 PROCEDURE — 99214 OFFICE O/P EST MOD 30 MIN: CPT | Performed by: INTERNAL MEDICINE

## 2025-05-27 PROCEDURE — 1036F TOBACCO NON-USER: CPT | Performed by: INTERNAL MEDICINE

## 2025-05-27 PROCEDURE — 1125F AMNT PAIN NOTED PAIN PRSNT: CPT | Performed by: INTERNAL MEDICINE

## 2025-05-27 ASSESSMENT — ENCOUNTER SYMPTOMS
NECK STIFFNESS: 0
DIZZINESS: 1
ARTHRALGIAS: 1
FATIGUE: 1
LOSS OF SENSATION IN FEET: 0
MYALGIAS: 1
OCCASIONAL FEELINGS OF UNSTEADINESS: 0
NECK PAIN: 1
DEPRESSION: 0

## 2025-05-27 ASSESSMENT — PAIN SCALES - GENERAL: PAINLEVEL_OUTOF10: 7

## 2025-05-27 NOTE — PROGRESS NOTES
Subjective   Patient ID: Dominguez Ross is a 79 y.o. male who presents for Follow-up.  HPI  Patient with history of polymyalgia rheumatica with elevated CPK and inflammatory arthritis.    Patient was last seen in November 2024 and at that time the inflammatory marker were back to normal  after we increased the dose of prednisone during the previous visit. He was seen at the urgent care in April with cold symptoms and diagnosed with viral URI.  He underwent carpal tunnel surgery bilaterally in early 2025 with Dr. Marinelli and is using a medical ball to increase grasp strength. Admit only the 3rd and 4th digits BL are a bit weaker but he is happy with the progress.  He still has his vertigo.  The PT did help in the past.    A month ago he strained his left knee while attempting to get into his car during a narrow space between the car and a wall.  He has been having slowly progressing left knee pain that is interfering with his ability to walk, or going up and down stairs.  Admitted at times the pain wakes him up from sleep at night.  Has been using Tylenol with significant relief, but results lasted only few hours.  Also admits of occasional left hip pain, it is harder for him to keep up while walking with his wife.    Review of Systems   Constitutional:  Positive for fatigue.   Musculoskeletal:  Positive for arthralgias, myalgias and neck pain. Negative for neck stiffness.   Neurological:  Positive for dizziness.       Objective   Physical Exam  GEN: NAD A&O x3  HEENT:no conjunctival redness, wearing glasses  NEURO: able to get out of chair without issues good strength in his upper and lower extremities  SKIN: no rashes  PULSES: +radials  MSK: no current swelling in the dip pip m some mild fullness in the PIPs and the wrists wrist elbows shoulders knees ankles.        Vitals:    05/27/25 1042   BP: 105/68   Pulse: 57   SpO2: 100%       Assessment/Plan       Elevated CPK history of PMR symptoms with elevated ESR with  history of inflammatory arthritis. Has tried steroid sparing agents but did not change his elevated cpk.     -Patient's hand symptoms are improving after carpal tunnel surgery early this year.  He continues to be on methotrexate 10 mg and prednisone 2.5 mg.  Last November inflammatory markers were normal. Muscle pain is fairly controlled at this moment. Complains of some fatigue likely from deconditioning.  Encouraged to continue exercising with walking, biking or swimming  Will have him do blood work.          -Patient is having left knee pain after a twist a month ago.  Likely can be associated with mensicus tear and/or OA of the L knee.  Will order xray for further evaluation and provided PT referral  Can use tylenol as needed  Discussed also steroid knee injection vs referral to ortho if pain persist.    Will see him again in 6 months or as needed    Diagnoses and all orders for this visit:  Polymyalgia rheumatica (Multi)  -     CBC; Future  -     Comprehensive Metabolic Panel; Future  -     C-Reactive Protein; Future  -     Sedimentation Rate; Future  Seronegative inflammatory arthritis  -     CBC; Future  -     Comprehensive Metabolic Panel; Future  -     C-Reactive Protein; Future  -     Sedimentation Rate; Future  Encounter for long-term (current) use of medications  -     CBC; Future  -     Comprehensive Metabolic Panel; Future  -     C-Reactive Protein; Future  -     Sedimentation Rate; Future  Left knee pain, unspecified chronicity  -     CBC; Future  -     Comprehensive Metabolic Panel; Future  -     C-Reactive Protein; Future  -     Sedimentation Rate; Future  -     XR knee left 3 views; Future  -     Referral to Physical Therapy; Future     Patient was seen and staffed with attending physician Dr. Ada Real MD  FM Resident, PGY2

## 2025-05-28 LAB
ALBUMIN SERPL-MCNC: 4.3 G/DL (ref 3.6–5.1)
ALP SERPL-CCNC: 68 U/L (ref 35–144)
ALT SERPL-CCNC: 18 U/L (ref 9–46)
ANION GAP SERPL CALCULATED.4IONS-SCNC: 6 MMOL/L (CALC) (ref 7–17)
AST SERPL-CCNC: 23 U/L (ref 10–35)
BILIRUB SERPL-MCNC: 0.9 MG/DL (ref 0.2–1.2)
BUN SERPL-MCNC: 26 MG/DL (ref 7–25)
CALCIUM SERPL-MCNC: 9.1 MG/DL (ref 8.6–10.3)
CHLORIDE SERPL-SCNC: 107 MMOL/L (ref 98–110)
CO2 SERPL-SCNC: 28 MMOL/L (ref 20–32)
CREAT SERPL-MCNC: 0.8 MG/DL (ref 0.7–1.28)
CRP SERPL-MCNC: <3 MG/L
EGFRCR SERPLBLD CKD-EPI 2021: 91 ML/MIN/1.73M2
ERYTHROCYTE [DISTWIDTH] IN BLOOD BY AUTOMATED COUNT: 12.2 % (ref 11–15)
ERYTHROCYTE [SEDIMENTATION RATE] IN BLOOD BY WESTERGREN METHOD: 2 MM/H
GLUCOSE SERPL-MCNC: 79 MG/DL (ref 65–99)
HCT VFR BLD AUTO: 40.7 % (ref 38.5–50)
HGB BLD-MCNC: 13.3 G/DL (ref 13.2–17.1)
MCH RBC QN AUTO: 34 PG (ref 27–33)
MCHC RBC AUTO-ENTMCNC: 32.7 G/DL (ref 32–36)
MCV RBC AUTO: 104.1 FL (ref 80–100)
PLATELET # BLD AUTO: 264 THOUSAND/UL (ref 140–400)
PMV BLD REES-ECKER: 9.4 FL (ref 7.5–12.5)
POTASSIUM SERPL-SCNC: 4.5 MMOL/L (ref 3.5–5.3)
PROT SERPL-MCNC: 6.4 G/DL (ref 6.1–8.1)
RBC # BLD AUTO: 3.91 MILLION/UL (ref 4.2–5.8)
SODIUM SERPL-SCNC: 141 MMOL/L (ref 135–146)
WBC # BLD AUTO: 5.7 THOUSAND/UL (ref 3.8–10.8)

## 2025-06-17 ENCOUNTER — EVALUATION (OUTPATIENT)
Dept: PHYSICAL THERAPY | Facility: CLINIC | Age: 80
End: 2025-06-17
Payer: MEDICARE

## 2025-06-17 DIAGNOSIS — M25.562 LEFT KNEE PAIN, UNSPECIFIED CHRONICITY: ICD-10-CM

## 2025-06-17 PROCEDURE — 97530 THERAPEUTIC ACTIVITIES: CPT | Mod: GP | Performed by: PHYSICAL THERAPIST

## 2025-06-17 PROCEDURE — 97110 THERAPEUTIC EXERCISES: CPT | Mod: GP | Performed by: PHYSICAL THERAPIST

## 2025-06-17 PROCEDURE — 97161 PT EVAL LOW COMPLEX 20 MIN: CPT | Mod: GP | Performed by: PHYSICAL THERAPIST

## 2025-06-17 ASSESSMENT — ENCOUNTER SYMPTOMS
DEPRESSION: 0
OCCASIONAL FEELINGS OF UNSTEADINESS: 1
LOSS OF SENSATION IN FEET: 0

## 2025-06-17 NOTE — PROGRESS NOTES
Physical Therapy Evaluation and Treatment      Patient Name: Dominguez Ross  MRN: [id number]  Today's Date: 6/17/2025  Time Calculation  Start Time: 0955  Stop Time: 1034  Time Calculation (min): 39 min  PT Therapeutic Procedures Time Entry  Therapeutic Exercise Time Entry: 14  Therapeutic Activity Time Entry: 11      Insurance:  Visit number: 1 of 5  Authorization info: 2025: MEDICARE A/B - NO AUTH / $257 DED MET / [place] / $0 used 2025 pt/st.  2) AARP  SUPP ACTIVE / PER RTE / ds 6/16/25 //   Insurance Type: Payor: MEDICARE / Plan: MEDICARE PART A AND B / Product Type: *No Product type* /     Current Problem:   1. Left knee pain, unspecified chronicity  Referral to Physical Therapy    Follow Up In Physical Therapy          Subjective    General:  Patient reports about two months ago he twisted his left knee when getting into his car. Then went to [employer] festival two weeks ago and had increased left knee pain after walking for awhile. Pain is a little better, but any time he walks it starts hurting. Pain mainly along inside of left knee. Initially had pain at night, but has gotten better. Doing yardwork, but has become a lot slower.       Precautions: Falls   Pain: 4/10       Objective   ROM   Left knee AROM:  Flex 129 deg  Ext 0 deg       MMT   Left LE strength:  Hip flex 4/5  Knee flex 4+/5  Knee ext 4+/5      Palpation   Mild tenderness to left patella  Mild tenderness to left medial knee joint line    No edema       Flexibility   Mild tightness in left hamstrings into gastroc      Special Tests   Knee: Lateral Patellar Tracking: Left negative, PF Grind: Left positive       Transfers   B UE support for sit to stand from normal chair      Gait   Ambulates with decreased teddy and mildly antalgic gait. Decreased knee flexion on left with decreased stance time. Mild foot slap on left      Stairs   Ascends and descends stairs with step-to-pattern using 1 rail      Outcome Measures:  LEFS:  32/80    Treatments:  Therapeutic Exercise: Access Code [email or web address]  QS  SLR  LAQ    Therapeutic activity:  Educated pt on eval findings and POC  Educated pt on anatomy of knee and joint structures       Assessment   Assessment:   Pt is a 79 y.o. male with left knee osteoarthritis. Pt with gait deficits, impaired balance, reduced strength, and flexibility limitations. Pt will benefit from skilled PT to address the above deficits for improvement in functional activities.       Low complexity due to patient's clinical presentation being stable and uncomplicated by any significant comorbidities that may affect rehab tolerance and progression.       Plan:   Treatment/Interventions: Education/ Instruction, Gait training, Manual therapy, Neuromuscular re-education, Self care/ home management, Taping techniques, Therapeutic activities, Therapeutic exercises  PT Plan: Skilled PT  PT Frequency: 1 time per week  Duration: 4 more visits  Number of Treatments Authorized: MN  Rehab Potential: Good  Plan of Care Agreement: Patient      Goals:   Active       Mobility       Goal 1       Start:  06/17/25    Expected End:  09/15/25       Pt will improve B LE strength to 5/5 to improve I/ADLs         Goal 2       Start:  06/17/25    Expected End:  09/15/25       Pt will improve B LE flexibility to WNL to improve I/ADLs            Pain       Goal 1       Start:  06/17/25    Expected End:  09/15/25       Pt will perform all house/yardwork with 0/10 pain         Goal 2       Start:  06/17/25    Expected End:  09/15/25       Pt will walk/stand >30 min with 0/10 pain to improve I/ADLs

## 2025-06-26 ENCOUNTER — TREATMENT (OUTPATIENT)
Dept: PHYSICAL THERAPY | Facility: CLINIC | Age: 80
End: 2025-06-26
Payer: MEDICARE

## 2025-06-26 DIAGNOSIS — M25.562 LEFT KNEE PAIN, UNSPECIFIED CHRONICITY: ICD-10-CM

## 2025-06-26 PROCEDURE — 97110 THERAPEUTIC EXERCISES: CPT | Mod: GP | Performed by: PHYSICAL THERAPIST

## 2025-06-26 NOTE — PROGRESS NOTES
"Physical Therapy Treatment    Patient Name: Dominguez Ross  MRN: [id number]  Today's Date: 6/26/2025  Time Calculation  Start Time: 1530  Stop Time: 1610  Time Calculation (min): 40 min  PT Therapeutic Procedures Time Entry  Therapeutic Exercise Time Entry: 40    Insurance:  Visit number: 2 of 5  Authorization info: 2025: MEDICARE A/B - NO AUTH / $257 DED MET / [place] / $0 used 2025 pt/st.  2) AARP  SUPP ACTIVE / PER RTE / ds 6/16/25 //   Insurance Type: Payor: MEDICARE / Plan: MEDICARE PART A AND B / Product Type: *No Product type*     Current Problem   1. Left knee pain, unspecified chronicity  Follow Up In Physical Therapy          Subjective   General   No left knee pain currently. Has been doing his activities around house without much difficulty. \"Trying to do exercises and it helps.\"       Precautions: Falls   Pain 0/10  Post Treatment Pain Level 0/10    Objective   Left quad with POOR eccentric control and lowering     Treatments:  Therapeutic Exercise:  NuStep L5 x6 minutes  Gastroc stretch at wedge, 30 seconds x 3  Stand hamstring stretch, 30 seconds x 3   3rd step knee flexion, 3x10  Stand heel raises 3x10   Stand hip ABD 2x10 ea R/L   FWD step ups 6inch 2x10  LAT step ups 6inch 2x10      Assessment   Assessment:   Pt tolerated there ex progressions without experiencing pain in left knee.   Cues required for all exercises to ensure full and correct LE muscle engagement.       Plan: progress strength    OP EDUCATION: cont with HEP     Goals:   Active       Mobility       Goal 1       Start:  06/17/25    Expected End:  09/15/25       Pt will improve B LE strength to 5/5 to improve I/ADLs         Goal 2       Start:  06/17/25    Expected End:  09/15/25       Pt will improve B LE flexibility to WNL to improve I/ADLs            Pain       Goal 1       Start:  06/17/25    Expected End:  09/15/25       Pt will perform all house/yardwork with 0/10 pain         Goal 2       Start:  06/17/25    Expected End:  " 09/15/25       Pt will walk/stand >30 min with 0/10 pain to improve I/ADLs

## 2025-07-01 ENCOUNTER — NURSE TRIAGE (OUTPATIENT)
Dept: AUDIOLOGY | Facility: CLINIC | Age: 80
End: 2025-07-01
Payer: MEDICARE

## 2025-07-01 NOTE — TELEPHONE ENCOUNTER
Established patient of Dr. Reyez hx of dizziness and abnormal VNG negative for BPPV. LOV audiology 3/4/2024 ENT 3/12/2024     Dizziness has been getting progressively worse recently and Mr. Ross is concerned about falling. At times dizziness seems positional.

## 2025-07-03 ENCOUNTER — CLINICAL SUPPORT (OUTPATIENT)
Dept: AUDIOLOGY | Facility: CLINIC | Age: 80
End: 2025-07-03
Payer: MEDICARE

## 2025-07-03 DIAGNOSIS — H90.3 ASYMMETRICAL SENSORINEURAL HEARING LOSS: ICD-10-CM

## 2025-07-03 DIAGNOSIS — R42 DIZZINESS: Primary | ICD-10-CM

## 2025-07-03 PROCEDURE — 92557 COMPREHENSIVE HEARING TEST: CPT

## 2025-07-03 PROCEDURE — 92567 TYMPANOMETRY: CPT

## 2025-07-03 ASSESSMENT — PAIN SCALES - GENERAL: PAINLEVEL_OUTOF10: 0 - NO PAIN

## 2025-07-03 ASSESSMENT — PAIN - FUNCTIONAL ASSESSMENT: PAIN_FUNCTIONAL_ASSESSMENT: 0-10

## 2025-07-07 ENCOUNTER — APPOINTMENT (OUTPATIENT)
Dept: OTOLARYNGOLOGY | Facility: CLINIC | Age: 80
End: 2025-07-07
Payer: MEDICARE

## 2025-07-07 VITALS — BODY MASS INDEX: 21.28 KG/M2 | HEIGHT: 71 IN | WEIGHT: 152 LBS

## 2025-07-07 DIAGNOSIS — H90.3 SENSORINEURAL HEARING LOSS (SNHL) OF BOTH EARS: ICD-10-CM

## 2025-07-07 DIAGNOSIS — R42 DIZZINESS AND GIDDINESS: Primary | ICD-10-CM

## 2025-07-07 PROCEDURE — 99214 OFFICE O/P EST MOD 30 MIN: CPT | Performed by: OTOLARYNGOLOGY

## 2025-07-07 PROCEDURE — 1159F MED LIST DOCD IN RCRD: CPT | Performed by: OTOLARYNGOLOGY

## 2025-07-07 PROCEDURE — 1036F TOBACCO NON-USER: CPT | Performed by: OTOLARYNGOLOGY

## 2025-07-07 NOTE — PROGRESS NOTES
Chief Complaint   Patient presents with    Follow-up     EP- AUDIO FOLLOW UP, DIZZINESS (AUDIO DONE 7/3)      Date of Evaluation: 7/7/2025   HPI  He returns in follow-up for dizziness.  He did have improvement with physical therapy 2 years ago.  He is complaining more of when he gets up from a chair he feels as though he needs to fall back.  He is complaining of of a backwards retropulsion problem at times.  He does have peripheral neuropathy.  No history of diabetes.  His hearing test is a little worse  MRI of cervical spine August 2023 showed:  IMPRESSION:  Marked degenerative changes and multiple level disc disease. The findings  are most pronounced at the C4-C5 level, with large disc extrusion deforming  the ventral thecal sac, however there is no abnormal cord signal to suggest  myelopathy. Marked degenerative changes also seen at the C5-C6 level with  marked right more than left foraminal stenosis.    Recall from March 2020 for:  VNG showed: Abnormal VNG     Oculomotor performance demonstrated abnormal accuracy for saccades for 3 separate trials suggesting CNS pathology.   All other oculomotor tasks were performed within normal limits.      Positional tests were normal; No dizziness reported; No BPPV    Caloric responses were symmetrical.   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  Dominguez Ross is a 80 y.o. male here for evaluation of dizziness.  9 months ago he had acute onset vertigo.  He was admitted to the hospital and kept overnight for workup that was negative.  His dizziness has improved but he still has some unsteadiness when walking around.  He does have a history of cervical disc disease.  This has been less symptomatic lately.  He has unsteadiness every day.  No vertigo at rest.  His audiogram shows bilateral mid to high-frequency sensorineural hearing loss with slight asymmetry with the left ear worse than right in the mid  frequencies.  He had noise exposure working at [employer] for 30 years.  He wore hearing protection.             Past Medical History:   Diagnosis Date    Abnormal finding of blood chemistry, unspecified 12/30/2013    Abnormal blood chemistry    Abnormal levels of other serum enzymes 03/10/2020    Elevated CPK    Abrasion of left upper arm, initial encounter 09/04/2019    Arm abrasion, left, initial encounter    Acquired trigger finger 05/10/2023    Acute upper respiratory infection, unspecified 12/29/2017    Acute upper respiratory infection    Arthritis 2010    Dr. Caballero    Autoimmune disorder (Multi) 2010    Dr Caballero    Autoimmune thyroiditis     Hashimoto's thyroiditis    Bilateral shoulder pain 05/10/2023    Colon adenomas 05/10/2023    Colon polyp 2024    removed polyps    Disease of thyroid gland 1997    Dizziness 05/2023    Ear problems 1999    Elevated CPK 08/17/2022    Encounter for general adult medical examination without abnormal findings 11/17/2017    Annual physical exam    Hemorrhage of anus and rectum 04/03/2019    Bright red rectal bleeding    HL (hearing loss) May2025 left ear    Hypothyroidism     Irregular heart beat 2021    Low back pain 1999    Myalgia, unspecified site     Muscular aches    Neck pain on left side 05/10/2023    Nuclear senile cataract 07/24/2018    Formatting of this note might be different from the original. Added automatically from request for surgery [id number] Formatting of this note might be different from the original. Added automatically from request for surgery [id number]    Occlusion and stenosis of bilateral carotid arteries 02/18/2023    Formatting of this note might be different from the original. Mild Test done at  2/2023    Other conditions influencing health status     Inflammatory Myopathy (Myositis)    Other conditions influencing health status     Nephrolithiasis Of The Right Kidney    Other conditions influencing health status     Induced CPK Elevation     Other conditions influencing health status 01/20/2016    History of cough    Other conditions influencing health status 11/17/2017    Chronic steroid use    Other disorders of iron metabolism     Iron overload    Pain in unspecified joint     Arthralgia of multiple sites    Personal history of other (healed) physical injury and trauma 11/03/2017    History of insect bite    Personal history of other diseases of male genital organs 10/02/2012    History of benign prostatic hypertrophy    Personal history of other diseases of the digestive system     History of hemorrhoids    Personal history of other diseases of the musculoskeletal system and connective tissue     History of polymyalgia rheumatica    Personal history of other diseases of the nervous system and sense organs     History of carpal tunnel syndrome    Personal history of other diseases of the nervous system and sense organs     History of peripheral neuropathy    Personal history of other diseases of the respiratory system 05/01/2018    History of acute sinusitis    Personal history of other diseases of the respiratory system 11/12/2015    History of acute pharyngitis    Personal history of other diseases of the respiratory system 03/09/2016    History of acute bronchitis    Personal history of other drug therapy 11/15/2016    History of influenza vaccination    Personal history of other endocrine, nutritional and metabolic disease     History of hypercholesterolemia    Personal history of other endocrine, nutritional and metabolic disease 11/17/2017    History of vitamin D deficiency    Personal history of other specified conditions     History of abdominal pain    Personal history of other specified conditions 02/12/2013    History of chest pain    Polymyalgia rheumatica (Multi) 1997    Polyneuropathy, unspecified     Polyneuropathy    Skin change 05/10/2023    Sprain of joints and ligaments of other parts of neck, initial encounter     Sprain of joints  "and ligaments of other parts of neck    Supraventricular tachycardia 12/16/2019    Tinnitus     Vertigo 2023      Past Surgical History:   Procedure Laterality Date    BACK SURGERY  08/02/2012    Lower Back Surgery    CATARACT EXTRACTION  08/21/2018    [facility] Dr Bustamante    COLONOSCOPY  10/29/2012    Complete Colonoscopy    EYE SURGERY      MR HEAD ANGIO WO IV CONTRAST  05/24/2023    MR HEAD ANGIO WO IV CONTRAST [facility]    OTHER SURGICAL HISTORY  08/02/2012    Biopsy Muscle    TRIGGER FINGER RELEASE  Nov/2022    Dr. Marinelli    WISDOM TOOTH EXTRACTION  1970          Medications:   Current Outpatient Medications   Medication Instructions    aspirin 81 mg, Daily RT    colesevelam (WELCHOL) 625 mg, 2 times daily (morning and late afternoon)    folic acid (FOLVITE) 1 mg, Daily RT    levothyroxine (SYNTHROID, LEVOXYL) 88 mcg, oral, Daily before breakfast    methotrexate (TREXALL) 10 mg, oral, Once Weekly    metoprolol succinate XL (TOPROL-XL) 50 mg, Daily    predniSONE (DELTASONE) 2.5 mg, oral, Daily    pseudoephedrine-guaifenesin (Mucinex D Maximum Strength) 120-1,200 mg tablet extended release 12 hr 1 tablet, oral, 2 times daily    tamsulosin (FLOMAX) 0.4 mg, oral, Daily        Allergies:  Allergies   Allergen Reactions    Statins-Hmg-Coa Reductase Inhibitors Other        Physical Exam:  Last Recorded Vitals  Height 1.803 m (5' 11\"), weight 68.9 kg (152 lb).  []General appearance: Well-developed, well-nourished in no acute distress, conversant with normal voice quality    Head/face: No erythema or edema or facial tenderness, and normal facial nerve function bilaterally    External ear: Clear external auditory canals with normal pinnae  Tube status: N/A  Middle ear: Tympanic membranes intact and mobile, middle ears normal.  Tympanic membrane perforation: N/A  Mastoid bowl: N/A  Hearing: Normal conversational awareness at normal speech thresholds    Nose visualized using: Anterior rhinoscopy  Nasal " dorsum: Nontraumatic midline appearance  Septum: Midline, nonobstructing  Inferior turbinates: Normal, pink  Secretions: Dry    Oral cavity and oropharynx: Normal  Teeth: Good condition  Floor of mouth: without lesions  Palate: Normal hard palate, soft palate and uvula  Oropharynx: Clear, no lesions present  Buccal mucosa: Normal without masses or lesions  Lips: Normal    Nasopharynx: Inadequate mirror exam secondary to gag/anatomy    Neck:  Salivary glands: Normal bilateral parotid and submandibular glands by inspection and palpation.  Non-thyroid masses: No palpable masses or significant lymphadenopathy  Trachea: Midline  Thyroid: No thyromegaly or palpable nodules  Temporomandibular joint: Nontender  Cervical range of motion: Normal    Neurologic exam: Alert and oriented x3, appropriate affect.  Cranial nerves II-XII normal bilaterally  Extraocular movement: Extraocular movement intact, normal gaze alignment        Dominguez was seen today for follow-up.  Diagnoses and all orders for this visit:  Dizziness and giddiness (Primary)  -     Referral to Physical Therapy; Future  Sensorineural hearing loss (SNHL) of both ears           PLAN  I do not believe his imbalance is related to peripheral vestibulopathy.  I am concerned about his peripheral neuropathy and the extent of his cervical spine disease 2 years ago.  The falling backwards piece is also more concerning from a neurologic standpoint.  I would like him to see a neurologist and I have given him the name of Dr. Nubia Fernández.  I have recommended vestibular rehabilitation.  A hearing aid evaluation is advisable    Kaveh Reyez MD

## 2025-07-09 ENCOUNTER — TREATMENT (OUTPATIENT)
Dept: PHYSICAL THERAPY | Facility: CLINIC | Age: 80
End: 2025-07-09
Payer: MEDICARE

## 2025-07-09 DIAGNOSIS — M25.562 LEFT KNEE PAIN, UNSPECIFIED CHRONICITY: ICD-10-CM

## 2025-07-09 PROCEDURE — 97110 THERAPEUTIC EXERCISES: CPT | Mod: GP | Performed by: PHYSICAL THERAPIST

## 2025-07-09 NOTE — PROGRESS NOTES
Physical Therapy Treatment    Patient Name: Dominguez Ross  MRN: [id number]  Today's Date: 7/9/2025      Insurance:  Visit number: 3 of 5  Authorization info: 2025: MEDICARE A/B - NO AUTH / $257 DED MET / [place] VISITS / $0 used 2025 pt/st.  [facility] / ds 6/16/25 //   Insurance Type: Payor: MEDICARE / Plan: MEDICARE PART A AND B / Product Type: *No Product type*     Current Problem   1. Left knee pain, unspecified chronicity  Follow Up In Physical Therapy          Subjective   General   No pain in left knee currently. Also did not have any soreness after last session.       Precautions: Falls   Pain 0/10  Post Treatment Pain Level 0/10    Objective   Left quad strength: 4+/5     Treatments:  Therapeutic Exercise:  NuStep L5 x6 minutes  Gastroc stretch at wedge, 30 seconds x 3  Stand hamstring stretch, 30 seconds x 3   3rd step knee flexion, 3x10  Stand heel raises off edge of step 3x10 (difficult)  FWD step ups 6inch 2x10  LAT step ups 6inch 2x10 ea R/L   Mini squat 3x10       Assessment   Assessment:   Good improvement in LE strength since initial eval. Increased awareness into gait pattern with focus on prevention of foot drop and slapping.       Plan: strengthening    OP EDUCATION: stretching before AND after activity       Goals:   Active       Mobility       Goal 1       Start:  06/17/25    Expected End:  09/15/25       Pt will improve B LE strength to 5/5 to improve I/ADLs         Goal 2       Start:  06/17/25    Expected End:  09/15/25       Pt will improve B LE flexibility to WNL to improve I/ADLs            Pain       Goal 1       Start:  06/17/25    Expected End:  09/15/25       Pt will perform all house/yardwork with 0/10 pain         Goal 2       Start:  06/17/25    Expected End:  09/15/25       Pt will walk/stand >30 min with 0/10 pain to improve I/ADLs

## 2025-07-14 DIAGNOSIS — M35.3 POLYMYALGIA RHEUMATICA (MULTI): ICD-10-CM

## 2025-07-14 RX ORDER — METHOTREXATE 2.5 MG/1
10 TABLET ORAL
Qty: 52 TABLET | Refills: 3 | Status: SHIPPED | OUTPATIENT
Start: 2025-07-20 | End: 2026-07-20

## 2025-07-14 NOTE — TELEPHONE ENCOUNTER
Faxed refill request from patient pharmacy    Rx: Methotrexate 2.5mg  Sig: take 4 tablets (10mg) one time every week  Qty:52  Pharmacy: [facility])    LOV:11/26/2024  NOV: 11/25/25    Prescription entered and sent to provider to renew.    Dannielle San RN

## 2025-07-23 ENCOUNTER — HOSPITAL ENCOUNTER (OUTPATIENT)
Dept: RADIOLOGY | Facility: HOSPITAL | Age: 80
Discharge: HOME | End: 2025-07-23
Payer: MEDICARE

## 2025-07-23 DIAGNOSIS — G95.9 DISEASE OF SPINAL CORD, UNSPECIFIED: ICD-10-CM

## 2025-07-23 PROCEDURE — 72141 MRI NECK SPINE W/O DYE: CPT

## 2025-07-23 PROCEDURE — 72141 MRI NECK SPINE W/O DYE: CPT | Performed by: RADIOLOGY

## 2025-07-24 DIAGNOSIS — E03.9 ACQUIRED HYPOTHYROIDISM: ICD-10-CM

## 2025-07-28 RX ORDER — LEVOTHYROXINE SODIUM 88 UG/1
TABLET ORAL
Qty: 90 TABLET | Refills: 1 | Status: SHIPPED | OUTPATIENT
Start: 2025-07-28

## 2025-07-29 DIAGNOSIS — N40.0 BENIGN PROSTATIC HYPERPLASIA WITHOUT LOWER URINARY TRACT SYMPTOMS: ICD-10-CM

## 2025-07-29 RX ORDER — TAMSULOSIN HYDROCHLORIDE 0.4 MG/1
0.4 CAPSULE ORAL DAILY
Qty: 90 CAPSULE | Refills: 1 | Status: SHIPPED | OUTPATIENT
Start: 2025-07-29

## 2025-08-27 ENCOUNTER — APPOINTMENT (OUTPATIENT)
Dept: RADIOLOGY | Facility: HOSPITAL | Age: 80
End: 2025-08-27
Payer: MEDICARE

## 2025-08-27 ENCOUNTER — HOSPITAL ENCOUNTER (EMERGENCY)
Facility: HOSPITAL | Age: 80
Discharge: HOME | End: 2025-08-27
Attending: STUDENT IN AN ORGANIZED HEALTH CARE EDUCATION/TRAINING PROGRAM
Payer: MEDICARE

## 2025-08-27 VITALS
HEIGHT: 71 IN | WEIGHT: 152 LBS | OXYGEN SATURATION: 97 % | DIASTOLIC BLOOD PRESSURE: 95 MMHG | BODY MASS INDEX: 21.28 KG/M2 | TEMPERATURE: 97.5 F | HEART RATE: 58 BPM | RESPIRATION RATE: 19 BRPM | SYSTOLIC BLOOD PRESSURE: 127 MMHG

## 2025-08-27 DIAGNOSIS — S09.90XA HEAD INJURY, INITIAL ENCOUNTER: ICD-10-CM

## 2025-08-27 DIAGNOSIS — M54.2 CERVICALGIA: ICD-10-CM
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
## 2025-08-27 LAB
ABO GROUP (TYPE) IN BLOOD: NORMAL
ALBUMIN SERPL BCP-MCNC: 4 G/DL (ref 3.4–5)
ALP SERPL-CCNC: 71 U/L (ref 33–136)
ALT SERPL W P-5'-P-CCNC: 15 U/L (ref 10–52)
ANION GAP SERPL CALCULATED.3IONS-SCNC: 8 MMOL/L (ref 10–20)
ANTIBODY SCREEN: NORMAL
AST SERPL W P-5'-P-CCNC: 22 U/L (ref 9–39)
BASOPHILS # BLD AUTO: 0.07 X10*3/UL (ref 0–0.1)
BASOPHILS NFR BLD AUTO: 1.2 %
BILIRUB SERPL-MCNC: 0.7 MG/DL (ref 0–1.2)
BUN SERPL-MCNC: 21 MG/DL (ref 6–23)
CALCIUM SERPL-MCNC: 9.1 MG/DL (ref 8.6–10.3)
CARDIAC TROPONIN I PNL SERPL HS: 4 NG/L (ref 0–20)
CARDIAC TROPONIN I PNL SERPL HS: 4 NG/L (ref 0–20)
CHLORIDE SERPL-SCNC: 106 MMOL/L (ref 98–107)
CO2 SERPL-SCNC: 28 MMOL/L (ref 21–32)
CREAT SERPL-MCNC: 0.94 MG/DL (ref 0.5–1.3)
EGFRCR SERPLBLD CKD-EPI 2021: 83 ML/MIN/1.73M*2
EOSINOPHIL # BLD AUTO: 0.24 X10*3/UL (ref 0–0.4)
EOSINOPHIL NFR BLD AUTO: 4.2 %
ERYTHROCYTE [DISTWIDTH] IN BLOOD BY AUTOMATED COUNT: 13.2 % (ref 11.5–14.5)
ETHANOL SERPL-MCNC: <10 MG/DL
GLUCOSE SERPL-MCNC: 106 MG/DL (ref 74–99)
HCT VFR BLD AUTO: 39.2 % (ref 41–52)
HGB BLD-MCNC: 13.5 G/DL (ref 13.5–17.5)
IMM GRANULOCYTES # BLD AUTO: 0.02 X10*3/UL (ref 0–0.5)
IMM GRANULOCYTES NFR BLD AUTO: 0.3 % (ref 0–0.9)
INR PPP: 1 (ref 0.9–1.2)
LACTATE SERPL-SCNC: 1.9 MMOL/L (ref 0.4–2)
LYMPHOCYTES # BLD AUTO: 1.26 X10*3/UL (ref 0.8–3)
LYMPHOCYTES NFR BLD AUTO: 21.8 %
MCH RBC QN AUTO: 34.2 PG (ref 26–34)
MCHC RBC AUTO-ENTMCNC: 34.4 G/DL (ref 32–36)
MCV RBC AUTO: 99 FL (ref 80–100)
MONOCYTES # BLD AUTO: 0.6 X10*3/UL (ref 0.05–0.8)
MONOCYTES NFR BLD AUTO: 10.4 %
NEUTROPHILS # BLD AUTO: 3.58 X10*3/UL (ref 1.6–5.5)
NEUTROPHILS NFR BLD AUTO: 62.1 %
NRBC BLD-RTO: 0 /100 WBCS (ref 0–0)
PLATELET # BLD AUTO: 248 X10*3/UL (ref 150–450)
POTASSIUM SERPL-SCNC: 4.1 MMOL/L (ref 3.5–5.3)
PROT SERPL-MCNC: 6.2 G/DL (ref 6.4–8.2)
PROTHROMBIN TIME: 11.3 SECONDS (ref 9.3–12.7)
RBC # BLD AUTO: 3.95 X10*6/UL (ref 4.5–5.9)
RH FACTOR (ANTIGEN D): NORMAL
SODIUM SERPL-SCNC: 138 MMOL/L (ref 136–145)
WBC # BLD AUTO: 5.8 X10*3/UL (ref 4.4–11.3)

## 2025-08-27 PROCEDURE — 72125 CT NECK SPINE W/O DYE: CPT | Performed by: RADIOLOGY

## 2025-08-27 PROCEDURE — 72170 X-RAY EXAM OF PELVIS: CPT | Performed by: RADIOLOGY

## 2025-08-27 PROCEDURE — 84484 ASSAY OF TROPONIN QUANT: CPT | Performed by: STUDENT IN AN ORGANIZED HEALTH CARE EDUCATION/TRAINING PROGRAM

## 2025-08-27 PROCEDURE — 85610 PROTHROMBIN TIME: CPT

## 2025-08-27 PROCEDURE — 71045 X-RAY EXAM CHEST 1 VIEW: CPT

## 2025-08-27 PROCEDURE — 72141 MRI NECK SPINE W/O DYE: CPT

## 2025-08-27 PROCEDURE — 83605 ASSAY OF LACTIC ACID: CPT

## 2025-08-27 PROCEDURE — 99285 EMERGENCY DEPT VISIT HI MDM: CPT | Mod: 25 | Performed by: STUDENT IN AN ORGANIZED HEALTH CARE EDUCATION/TRAINING PROGRAM

## 2025-08-27 PROCEDURE — 72125 CT NECK SPINE W/O DYE: CPT

## 2025-08-27 PROCEDURE — 36415 COLL VENOUS BLD VENIPUNCTURE: CPT

## 2025-08-27 PROCEDURE — 72141 MRI NECK SPINE W/O DYE: CPT | Performed by: STUDENT IN AN ORGANIZED HEALTH CARE EDUCATION/TRAINING PROGRAM

## 2025-08-27 PROCEDURE — 71045 X-RAY EXAM CHEST 1 VIEW: CPT | Performed by: RADIOLOGY

## 2025-08-27 PROCEDURE — 70450 CT HEAD/BRAIN W/O DYE: CPT

## 2025-08-27 PROCEDURE — 82077 ASSAY SPEC XCP UR&BREATH IA: CPT

## 2025-08-27 PROCEDURE — 72170 X-RAY EXAM OF PELVIS: CPT

## 2025-08-27 PROCEDURE — 80053 COMPREHEN METABOLIC PANEL: CPT

## 2025-08-27 PROCEDURE — 36415 COLL VENOUS BLD VENIPUNCTURE: CPT | Performed by: STUDENT IN AN ORGANIZED HEALTH CARE EDUCATION/TRAINING PROGRAM

## 2025-08-27 PROCEDURE — G0390 TRAUMA RESPONS W/HOSP CRITI: HCPCS

## 2025-08-27 PROCEDURE — 85025 COMPLETE CBC W/AUTO DIFF WBC: CPT

## 2025-08-27 PROCEDURE — 86901 BLOOD TYPING SEROLOGIC RH(D): CPT

## 2025-08-27 RX ORDER — IBUPROFEN 800 MG/1
800 TABLET, FILM COATED ORAL 3 TIMES DAILY
Qty: 21 TABLET | Refills: 0 | Status: SHIPPED | OUTPATIENT
Start: 2025-08-27 | End: 2025-08-29 | Stop reason: WASHOUT

## 2025-08-27 RX ORDER — LIDOCAINE 50 MG/G
1 PATCH TOPICAL DAILY
Qty: 9 PATCH | Refills: 0 | Status: SHIPPED | OUTPATIENT
Start: 2025-08-27 | End: 2025-08-29 | Stop reason: WASHOUT

## 2025-08-27 RX ORDER — ACETAMINOPHEN 325 MG/1
650 TABLET ORAL EVERY 6 HOURS PRN
Qty: 30 TABLET | Refills: 0 | Status: SHIPPED | OUTPATIENT
Start: 2025-08-27 | End: 2025-09-06

## 2025-08-27 RX ORDER — TRAMADOL HYDROCHLORIDE 50 MG/1
50 TABLET, FILM COATED ORAL EVERY 6 HOURS PRN
Qty: 6 TABLET | Refills: 0 | Status: SHIPPED | OUTPATIENT
Start: 2025-08-27 | End: 2025-08-29 | Stop reason: WASHOUT

## 2025-08-27 RX ORDER — CYCLOBENZAPRINE HCL 10 MG
10 TABLET ORAL 2 TIMES DAILY PRN
Qty: 20 TABLET | Refills: 0 | Status: SHIPPED | OUTPATIENT
Start: 2025-08-27 | End: 2025-08-29 | Stop reason: WASHOUT

## 2025-08-27 ASSESSMENT — PAIN - FUNCTIONAL ASSESSMENT: PAIN_FUNCTIONAL_ASSESSMENT: 0-10

## 2025-08-27 ASSESSMENT — PAIN SCALES - GENERAL: PAINLEVEL_OUTOF10: 6

## 2025-08-29 ENCOUNTER — OFFICE VISIT (OUTPATIENT)
Dept: ORTHOPEDIC SURGERY | Facility: CLINIC | Age: 80
End: 2025-08-29
Payer: MEDICARE

## 2025-08-29 DIAGNOSIS — G99.2 STENOSIS OF CERVICAL SPINE WITH MYELOPATHY: Primary | ICD-10-CM

## 2025-08-29 DIAGNOSIS — M48.02 STENOSIS OF CERVICAL SPINE WITH MYELOPATHY: Primary | ICD-10-CM

## 2025-08-29 PROCEDURE — 99215 OFFICE O/P EST HI 40 MIN: CPT | Performed by: ORTHOPAEDIC SURGERY

## 2025-08-29 PROCEDURE — 1160F RVW MEDS BY RX/DR IN RCRD: CPT | Performed by: ORTHOPAEDIC SURGERY

## 2025-08-29 PROCEDURE — 1036F TOBACCO NON-USER: CPT | Performed by: ORTHOPAEDIC SURGERY

## 2025-08-29 PROCEDURE — 99212 OFFICE O/P EST SF 10 MIN: CPT | Performed by: ORTHOPAEDIC SURGERY

## 2025-08-29 PROCEDURE — 1125F AMNT PAIN NOTED PAIN PRSNT: CPT | Performed by: ORTHOPAEDIC SURGERY

## 2025-08-29 PROCEDURE — 1159F MED LIST DOCD IN RCRD: CPT | Performed by: ORTHOPAEDIC SURGERY

## 2025-08-29 ASSESSMENT — PAIN SCALES - GENERAL: PAINLEVEL_OUTOF10: 8

## 2025-08-29 ASSESSMENT — ENCOUNTER SYMPTOMS
LOSS OF SENSATION IN FEET: 0
DEPRESSION: 0
OCCASIONAL FEELINGS OF UNSTEADINESS: 0

## 2025-08-29 ASSESSMENT — COLUMBIA-SUICIDE SEVERITY RATING SCALE - C-SSRS
2. HAVE YOU ACTUALLY HAD ANY THOUGHTS OF KILLING YOURSELF?: NO
1. IN THE PAST MONTH, HAVE YOU WISHED YOU WERE DEAD OR WISHED YOU COULD GO TO SLEEP AND NOT WAKE UP?: NO
6. HAVE YOU EVER DONE ANYTHING, STARTED TO DO ANYTHING, OR PREPARED TO DO ANYTHING TO END YOUR LIFE?: NO

## 2025-08-29 ASSESSMENT — PAIN - FUNCTIONAL ASSESSMENT: PAIN_FUNCTIONAL_ASSESSMENT: 0-10

## 2025-08-29 ASSESSMENT — PAIN DESCRIPTION - DESCRIPTORS: DESCRIPTORS: ACHING;OTHER (COMMENT)

## 2025-10-07 ENCOUNTER — APPOINTMENT (OUTPATIENT)
Dept: NEUROSURGERY | Facility: CLINIC | Age: 80
End: 2025-10-07
Payer: MEDICARE

## 2025-11-25 ENCOUNTER — APPOINTMENT (OUTPATIENT)
Dept: RHEUMATOLOGY | Facility: CLINIC | Age: 80
End: 2025-11-25
Payer: MEDICARE

## 2025-12-18 ENCOUNTER — APPOINTMENT (OUTPATIENT)
Dept: PRIMARY CARE | Facility: CLINIC | Age: 80
End: 2025-12-18
Payer: MEDICARE

## (undated) DEVICE — SUTURE, MONOCRYL, 4-0, 18 IN, PS2, UNDYED

## (undated) DEVICE — ADHESIVE, SKIN, DERMABOND ADVANCED, 15CM, PEN-STYLE

## (undated) DEVICE — Device

## (undated) DEVICE — SOLUTION, IRRIGATION, SODIUM CHLORIDE 0.9%, 1000 ML, POUR BOTTLE

## (undated) DEVICE — STOCKINETTE, TUBE, BLN, ST, 1 PLY, 4 X 48 IN, LF

## (undated) DEVICE — PREP TRAY, SKIN, DRY, W/GLOVES